# Patient Record
Sex: FEMALE | Race: WHITE | Employment: STUDENT | URBAN - METROPOLITAN AREA
[De-identification: names, ages, dates, MRNs, and addresses within clinical notes are randomized per-mention and may not be internally consistent; named-entity substitution may affect disease eponyms.]

---

## 2018-01-18 ENCOUNTER — OFFICE VISIT (OUTPATIENT)
Dept: ORTHOPEDIC SURGERY | Age: 23
End: 2018-01-18

## 2018-01-18 VITALS
RESPIRATION RATE: 16 BRPM | HEART RATE: 70 BPM | DIASTOLIC BLOOD PRESSURE: 71 MMHG | OXYGEN SATURATION: 100 % | SYSTOLIC BLOOD PRESSURE: 114 MMHG | BODY MASS INDEX: 27.49 KG/M2 | HEIGHT: 64 IN | WEIGHT: 161 LBS

## 2018-01-18 DIAGNOSIS — M54.6 TRIGGER POINT OF THORACIC REGION: ICD-10-CM

## 2018-01-18 DIAGNOSIS — G25.89 SCAPULAR DYSKINESIS: Primary | ICD-10-CM

## 2018-01-18 DIAGNOSIS — M99.08 RIB CAGE REGION SOMATIC DYSFUNCTION: ICD-10-CM

## 2018-01-18 DIAGNOSIS — M99.02 THORACIC REGION SOMATIC DYSFUNCTION: ICD-10-CM

## 2018-01-18 RX ORDER — PREDNISONE 10 MG/1
TABLET ORAL
Qty: 12 TAB | Refills: 0 | Status: SHIPPED | OUTPATIENT
Start: 2018-01-18 | End: 2018-02-08 | Stop reason: ALTCHOICE

## 2018-01-18 NOTE — PATIENT INSTRUCTIONS
Healthy Upper Back: Exercises  Your Care Instructions  Here are some examples of exercises for your upper back. Start each exercise slowly. Ease off the exercise if you start to have pain. Your doctor or physical therapist will tell you when you can start these exercises and which ones will work best for you. How to do the exercises  Lower neck and upper back stretch    1. Stretch your arms out in front of your body. Clasp one hand on top of your other hand. 2. Gently reach out so that you feel your shoulder blades stretching away from each other. 3. Gently bend your head forward. 4. Hold for 15 to 30 seconds. 5. Repeat 2 to 4 times. Midback stretch    If you have knee pain, do not do this exercise. 1. Kneel on the floor, and sit back on your ankles. 2. Lean forward, place your hands on the floor, and stretch your arms out in front of you. Rest your head between your arms. 3. Gently push your chest toward the floor, reaching as far in front of you as possible. 4. Hold for 15 to 30 seconds. 5. Repeat 2 to 4 times. Shoulder rolls    1. Sit comfortably with your feet shoulder-width apart. You can also do this exercise while standing. 2. Roll your shoulders up, then back, and then down in a smooth, circular motion. 3. Repeat 2 to 4 times. Wall push-up    1. Stand against a wall with your feet about 12 to 24 inches back from the wall. If you feel any pain when you do this exercise, stand closer to the wall. 2. Place your hands on the wall slightly wider apart than your shoulders, and lean forward. 3. Gently lean your body toward the wall. Then push back to your starting position. Keep the motion smooth and controlled. 4. Repeat 8 to 12 times. Resisted shoulder blade squeeze    For this exercise, you will need elastic exercise material, such as surgical tubing or Thera-Band. 1. Sit or stand, holding the band in both hands in front of you.  Keep your elbows close to your sides, bent at a 90-degree angle. Your palms should face up. 2. Squeeze your shoulder blades together, and move your arms to the outside, stretching the band. Be sure to keep your elbows at your sides while you do this. 3. Relax. 4. Repeat 8 to 12 times. Resisted rows    For this exercise, you will need elastic exercise material, such as surgical tubing or Thera-Band. 1. Put the band around a solid object, such as a bedpost, at about waist level. Hold one end of the band in each hand. 2. With your elbows at your sides and bent to 90 degrees, pull the band back to move your shoulder blades toward each other. Return to the starting position. 3. Repeat 8 to 12 times. Follow-up care is a key part of your treatment and safety. Be sure to make and go to all appointments, and call your doctor if you are having problems. It's also a good idea to know your test results and keep a list of the medicines you take. Where can you learn more? Go to http://kristina-mathieu.info/. Enter H654 in the search box to learn more about \"Healthy Upper Back: Exercises. \"  Current as of: March 21, 2017  Content Version: 11.4  © 6849-8247 Healthwise, Incorporated. Care instructions adapted under license by [employer] (which disclaims liability or warranty for this information). If you have questions about a medical condition or this instruction, always ask your healthcare professional. [place] [address] any warranty or liability for your use of this information.

## 2018-01-18 NOTE — MR AVS SNAPSHOT
[address] [place] [address], [address] [facility] [address] 
[phone] Patient: Marita Verde MRN: [id number] :1995 Visit Information Date & Time Provider Department Dept. Phone Encounter #  
 2018  9:00 AM Tony Wright, [address] [facility] [phone] [id number] Follow-up Instructions Return in about 3 weeks (around 2018) for ribs, trigger point. Upcoming Health Maintenance Date Due Hepatitis A Peds Age 1-18 (1 of 2 - Standard Series) 10/20/1998 DTaP/Tdap/Td series (1 - Tdap) 10/20/2004 HPV AGE 9Y-26Y (1 of 3 - Female 3 Dose Series) 10/20/2008 Influenza Age 5 to Adult 2017 Allergies as of 2018  Review Complete On: 2018 By: Tony Wright, DO No Known Allergies Current Immunizations  Never Reviewed No immunizations on file. Not reviewed this visit You Were Diagnosed With   
  
 Codes Comments Scapular dyskinesis    -  Primary ICD-10-CM: G25.89 ICD-9-CM: 781.3 Trigger point of thoracic region     ICD-10-CM: M54.6 ICD-9-CM: 724.5 Thoracic region somatic dysfunction     ICD-10-CM: M99.02 
ICD-9-CM: 739.2 Rib cage region somatic dysfunction     ICD-10-CM: M99.08 
ICD-9-CM: 739.8 Vitals BP Pulse Resp Height(growth percentile) Weight(growth percentile) LMP  
 114/71 (BP 1 Location: Left arm, BP Patient Position: Sitting) 70 16 5' 4\" (1.626 m) 161 lb (73 kg) 2018 (Exact Date) SpO2 BMI OB Status Smoking Status 100% 27.64 kg/m2 Having regular periods Never Smoker BMI and BSA Data Body Mass Index Body Surface Area  
 27.64 kg/m 2 1.82 m 2 Your Updated Medication List  
  
   
This list is accurate as of: 18  9:46 AM.  Always use your most recent med list.  
  
  
  
  
 predniSONE 10 mg tablet Commonly known as:  DELTASONE  
2 tabs daily for 4 days, then 1 tab daily until gone. Prescriptions Printed Refills  
 predniSONE (DELTASONE) 10 mg tablet 0 Si tabs daily for 4 days, then 1 tab daily until gone. Class: Print We Performed the Following INJECT TRIGGER POINTS, > 3 [id number] CPT(R)] [place] OSTEOPATHIC MANIP,1-2 BODY REGN [id number] CPT(R)] Follow-up Instructions Return in about 3 weeks (around 2018) for ribs, trigger point. Patient Instructions Healthy Upper Back: Exercises Your Care Instructions Here are some examples of exercises for your upper back. Start each exercise slowly. Ease off the exercise if you start to have pain. Your doctor or physical therapist will tell you when you can start these exercises and which ones will work best for you. How to do the exercises Lower neck and upper back stretch 1. Stretch your arms out in front of your body. Clasp one hand on top of your other hand. 2. Gently reach out so that you feel your shoulder blades stretching away from each other. 3. Gently bend your head forward. 4. Hold for 15 to 30 seconds. 5. Repeat 2 to 4 times. Midback stretch If you have knee pain, do not do this exercise. 1. Kneel on the floor, and sit back on your ankles. 2. Lean forward, place your hands on the floor, and stretch your arms out in front of you. Rest your head between your arms. 3. Gently push your chest toward the floor, reaching as far in front of you as possible. 4. Hold for 15 to 30 seconds. 5. Repeat 2 to 4 times. Shoulder rolls 1. Sit comfortably with your feet shoulder-width apart. You can also do this exercise while standing. 2. Roll your shoulders up, then back, and then down in a smooth, circular motion. 3. Repeat 2 to 4 times. Wall push-up 1. Stand against a wall with your feet about 12 to 24 inches back from the wall. If you feel any pain when you do this exercise, stand closer to the wall.  
2. Place your hands on the wall slightly wider apart than your shoulders, and lean forward. 3. Gently lean your body toward the wall. Then push back to your starting position. Keep the motion smooth and controlled. 4. Repeat 8 to 12 times. Resisted shoulder blade squeeze For this exercise, you will need elastic exercise material, such as surgical tubing or Thera-Band. 1. Sit or stand, holding the band in both hands in front of you. Keep your elbows close to your sides, bent at a 90-degree angle. Your palms should face up. 2. Squeeze your shoulder blades together, and move your arms to the outside, stretching the band. Be sure to keep your elbows at your sides while you do this. 3. Relax. 4. Repeat 8 to 12 times. Resisted rows For this exercise, you will need elastic exercise material, such as surgical tubing or Thera-Band. 1. Put the band around a solid object, such as a bedpost, at about waist level. Hold one end of the band in each hand. 2. With your elbows at your sides and bent to 90 degrees, pull the band back to move your shoulder blades toward each other. Return to the starting position. 3. Repeat 8 to 12 times. Follow-up care is a key part of your treatment and safety. Be sure to make and go to all appointments, and call your doctor if you are having problems. It's also a good idea to know your test results and keep a list of the medicines you take. Where can you learn more? Go to http://kristina-mathieu.info/. Enter J729 in the search box to learn more about \"Healthy Upper Back: Exercises. \" Current as of: March 21, 2017 Content Version: 11.4 © 7568-3614 Healthwise, Incorporated. Care instructions adapted under license by [employer] (which disclaims liability or warranty for this information). If you have questions about a medical condition or this instruction, always ask your healthcare professional. [place] [address] any warranty or liability for your use of this information. Introducing [facility]! [facility] introduces [employer] patient portal. Now you can access parts of your medical record, email your doctor's office, and request medication refills online. 1. In your internet browser, go to [email or web address] 2. Click on the First Time User? Click Here link in the Sign In box. You will see the New Member Sign Up page. 3. Enter your [employer] Access Code exactly as it appears below. You will not need to use this code after youve completed the sign-up process. If you do not sign up before the expiration date, you must request a new code. · [employer] Access Code: [email or web address] Expires: 4/18/2018  9:41 AM 
 
4. Enter the last four digits of your Social Security Number (xxxx) and Date of Birth (mm/dd/yyyy) as indicated and click Submit. You will be taken to the next sign-up page. 5. Create a [employer] ID. This will be your [employer] login ID and cannot be changed, so think of one that is secure and easy to remember. 6. Create a [employer] password. You can change your password at any time. 7. Enter your Password Reset Question and Answer. This can be used at a later time if you forget your password. 8. Enter your e-mail address. You will receive e-mail notification when new information is available in [address] 9. Click Sign Up. You can now view and download portions of your medical record. 10. Click the Download Summary menu link to download a portable copy of your medical information. If you have questions, please visit the Frequently Asked Questions section of the [employer] website. Remember, [employer] is NOT to be used for urgent needs. For medical emergencies, dial 911. Now available from your iPhone and Android! Please provide this summary of care documentation to your next provider. If you have any questions after today's visit, please call [phone].

## 2018-01-18 NOTE — PROGRESS NOTES
HISTORY OF PRESENT ILLNESS    Melanie Diamond is a [address] [facility] [employer] year old female comes in today as new patient for: rib pain left    Patients symptoms have been present for 2 months. Pain level 4/10 left ribs, It has worsened with swimming. Patient has tried:  Ice and stim from ATC at [employer]. It is described as pain in lateral ribs w/o known cause. Social History     Social History    Marital status: SINGLE     Spouse name: N/A    Number of children: N/A    Years of education: N/A     Social History Main Topics    Smoking status: Never Smoker    Smokeless tobacco: Never Used    Alcohol use No    Drug use: No    Sexual activity: Not Asked     Other Topics Concern    None     Social History Narrative    None       Past Medical History:   Diagnosis Date    Arthritis      Family History   Problem Relation Age of Onset    No Known Problems Father          ROS:  No numb, tingle, swell, bruise. All other systems reviewed and negative aside from that written in the HPI. Objective:  Visit Vitals    /71 (BP 1 Location: Left arm, BP Patient Position: Sitting)    Pulse 70    Resp 16    Ht 5' 4\" (1.626 m)    Wt 161 lb (73 kg)    LMP 01/17/2018 (Exact Date)    SpO2 100%    BMI 27.64 kg/m2     GEN:  Appears stated age in NAD. HEAD:  Normocephalic, Atraumatic. NEURO:  Sensation intact light touch upper and lower extremities. Biceps & Triceps reflexes +2/4 bilaterally. Patellar and Achilles +2/4  M/S:  Examined seating and supine. Spurling negative bilateral .  Cervical rotation Normal bilateral  TTA at T2, 4, 5, 7 on left, worse with flexion. Rib(s) 5, 7, 8, 9 TTP on left. Strength +5/5 Bilateral upper and lower extremities. Trigge rpoints trap left at T 5, 6, 7. EXT  no clubbing/cyanosis. no edema. SKIN: Warm & dry w/o rash. HEENT: Conjunctiva/lids WNL. External canals/nares WNL. Tongue midline. PERRL, EOMI. Hearing intact. NECK: Trachea midline. Supple, Full ROM. No thyromegaly.   CARDIAC: No edema.  LUNGS: Normal effort. ABD: Soft, no masses. No HSM. PSYCH: A+O x3. Appropriate judgment and insight. Assessment/Plan:     ICD-10-CM ICD-9-CM    1. Scapular dyskinesis G25.89 781.3 predniSONE (DELTASONE) 10 mg tablet   2. Trigger point of thoracic region M54.6 724.5 INJECT TRIGGER POINTS, > 3      predniSONE (DELTASONE) 10 mg tablet   3. Thoracic region somatic dysfunction M99.02 739.2 LA OSTEOPATHIC MANIP,1-2 BODY REGN   4. Rib cage region somatic dysfunction M99.08 739.8 LA OSTEOPATHIC MANIP,1-2 BODY REGN       Patient verbalizes understanding of evaluation and plan. Injected trigger points and manipulated Tspine and ribs w/ benefit. Will work on HEP/stretch upper back and take this weekend off from swimming at [employer] (letter provided) and if improving gradual return to pool in next week or so and RTC 2-3 weeks.

## 2018-01-18 NOTE — LETTER
NOTIFICATION RETURN TO WORK / SCHOOL 
 
1/18/2018 9:44 AM 
 
Ms. Sherin Anderson [facility] [address] To Whom It May Concern: 
 
Sherin Anderson is currently under the care of [address] [place] [address]. Just do rehab and ice/stim/stretch until Monday and then if symptoms improved may slowly transition to pool. Will plan follow-up in 2-3 weeks. If there are questions or concerns please have the patient contact our office.  
 
 
 
Sincerely, 
 
 
Kamala Pascual, DO

## 2018-01-18 NOTE — PROCEDURES
PROCEDURE NOTE:  Time out:932am  * Patient was identified by name and date of birth   * Agreement on procedure being performed was verified  * Risks and Benefits explained to the patient  * Procedure site verified and marked as necessary  * Patient was positioned for comfort  * Consent was signed and verified. Risks/benefits including but not limited to bleeding, infection, and scarring discussed and Pt wishes to proceed with procedure. A trigger point injection was performed at the site of maximal tenderness (left trap T5, 6, 7 using 1 cc 1% lidocaine each. This was well tolerated, and sterile bandage applied. Pain is now rated 0-1/10. Discussed S/Sx infection and advised to contact me if any issues.

## 2018-01-22 ENCOUNTER — TELEPHONE (OUTPATIENT)
Dept: ORTHOPEDIC SURGERY | Age: 23
End: 2018-01-22

## 2018-01-22 DIAGNOSIS — G25.89 SCAPULAR DYSKINESIS: Primary | ICD-10-CM

## 2018-01-22 RX ORDER — MELOXICAM 15 MG/1
TABLET ORAL
Qty: 90 TAB | Refills: 0 | Status: SHIPPED | OUTPATIENT
Start: 2018-01-22

## 2018-01-22 NOTE — TELEPHONE ENCOUNTER
Called to discuss issues with ribs not much benefit so will shut her down from swimming and start PT and RTC 1 month.

## 2018-01-22 NOTE — TELEPHONE ENCOUNTER
PATIENT CALLED AND SAID SHE NEEDED TO SPEAK WITH DR. Jean Hanks ABOUT THE PAIN IN HER RIBS. PATIENT SAID THAT THE CORTISONE INJECTION ON 1/18/18 DID NOT WORK. PATIENT SAID HER  WOULD LIKE TO KNOW IF SHE CAN BE ON PAIN MEDICATION. [facility] [place] [address] [phone]. PATIENT [phone].

## 2018-02-01 ENCOUNTER — HOSPITAL ENCOUNTER (OUTPATIENT)
Dept: PHYSICAL THERAPY | Age: 23
Discharge: HOME | End: 2018-02-01
Payer: COMMERCIAL

## 2018-02-01 PROCEDURE — 97110 THERAPEUTIC EXERCISES: CPT

## 2018-02-01 PROCEDURE — 97140 MANUAL THERAPY 1/> REGIONS: CPT

## 2018-02-01 PROCEDURE — 97161 PT EVAL LOW COMPLEX 20 MIN: CPT

## 2018-02-01 PROCEDURE — 97035 APP MDLTY 1+ULTRASOUND EA 15: CPT

## 2018-02-01 NOTE — PROGRESS NOTES
[address] [facility] [address] [place], [place]  Phone: [phone] Fax: [phone] / STATEMENT OF MEDICAL NECESSITY FOR PHYSICAL THERAPY SERVICES  Patient Name: Caryle Katz : 1995   Medical   Diagnosis: Rib pain on left side [R07.81] Treatment Diagnosis: L costochondritis and scapular dyskinesia   Onset Date: 2-3 mo ago      Referral Source: Gen Mari DO Start of Care [facility] [place]): 2018   Prior Hospitalization: See medical history Provider #: [id number]   Prior Level of Function: Functional I    Comorbidities: None noted    Medications: Verified on Patient Summary List   The Plan of Care and following information is based on the information from the initial evaluation.   ========================================================================  Assessment / key information:  Pt is a 23y.o. year old female who presents with co L sided rib pain (approx 10-11) starting 2-3 mo ago insidious onset with progressive worsening. 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 Trigger point injection was unsuccessful in pain relief. Patient has attempted TEN and Ice with ATC at [employer] with min improvement. ,Meloxicam,  stretching and massage has helped some in recent weeks. Patient is currently still swimming to train for championship and feels the pain limits her from training. Patient is R hand dominant. Current deficits include: increased pain to 10/10 at worst, TTP with taut bands of L periscapulars and L costochondral junction, pain with rib PA mobs, postural strength: MT 4+/5, LT 3/5, forward shoulder posture and sap winging B. Functional deficits include: laying on L side: intermittent sleep interruption, anything elevating HR/ increased breathing frequency, limited chest expansion, swimming- on scholarship, wt training. Home exercise program initiated on initial evaluation to address these deficits.  Pt would benefit from PT to address these deficits for increased functional mobility and QOL.  ========================================================================  Eval Complexity: History: LOW Complexity : Zero comorbidities / personal factors that will impact the outcome / POCExam:HIGH Complexity : 4+ Standardized tests and measures addressing body structure, function, activity limitation and / or participation in recreation  Presentation: MEDIUM Complexity : Evolving with changing characteristics  Clinical Decision Making:MEDIUM Complexity : FOTO score of 26-74Overall Complexity:LOW   Problem List: pain affecting function, decrease strength, impaired gait/ balance, decrease ADL/ functional abilitiies, decrease activity tolerance and decrease flexibility/ joint mobility   Treatment Plan may include any combination of the following: Therapeutic exercise, Therapeutic activities, Neuromuscular re-education, Physical agent/modality, Gait/balance training, Manual therapy, Aquatic therapy, Patient education, Self Care training, Functional mobility training and Home safety training  Patient / Family readiness to learn indicated by: asking questions, trying to perform skills and interest  Persons(s) to be included in education: patient (P)  Barriers to Learning/Limitations: None  Measures taken:    Patient Goal (s): \"to make the pain go away \"   Patient self reported health status: good  Rehabilitation Potential: good   Short Term Goals: To be accomplished in  1  weeks:  1. Pt will be independent and compliant with HEP   Long Term Goals: To be accomplished in  8-12  treatments:  1. Patient will increase FOTO score to 67/100 for indications of increased functional mobility. 2.  Patient will demo LT strength 4/5 for improve scapular strength with breast stroke   3. Patient will demo 5/5 MT strength for improved postural stability with sitting in class   4.  Patient will report 50% improvement in sx for progression of [employer] quality for school scholarship  Frequency / Duration:   Patient to be seen  2-3  times per week for 8-12  treatments:  Patient / Caregiver education and instruction: self care, activity modification and exercises  G-Codes (GP): MESERET  Therapist Signature: Wally Freedman PT Date: 4/5/4430   Certification Period: NA Time: 231pm    ========================================================================  I certify that the above Physical Therapy Services are being furnished while the patient is under my care. I agree with the treatment plan and certify that this therapy is necessary. Physician Signature:        Date:       Time:   Please sign and return to [facility] or you may fax the signed copy to [phone]. Thank you.

## 2018-02-01 NOTE — PROGRESS NOTES
PT LUMBAR EVAL AND TREATMENT     Patient Name: Dez Noble  Date:2018  : 1995  [x]  Patient  Verified  Payor: BLUE CROSS / Plan: [facility] PPO / Product Type: PPO /    In time:100  Out time:157  Total Treatment Time (min): 62  Visit #: 1 of     Treatment Area: Rib pain on left side [R07.81]    SUBJECTIVE  Pain Level (0-10 scale): (C): 3-4  (B): 0  (W):  10  Any medication changes, allergies to medications, diagnosis change, or new procedure performed: see summary sheet for update  Subjective functional status/changes  CHIEF COMPLAINT: Patient reports with co L sided rib pain starting 2-3 mo ago insidious onset with progressive worsening. 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 Trigger point injection was unsuccessful in pain relief. Patient has attempted TEN and Ice with ATC at [employer] with min improvement. ,Meloxicam,  stretching and massage has helped some in recent weeks. Patient is currently still swimming to train for championship and feels the pain limits her from training. Patient is R hand dominant.      Functional Status  Present functional limitations: laying on L side: intermittent sleep interruption, anything elevating HR/ increased breathing frequency, limited chest expansion, swimming- on scholarship, wt training    Mechanism of injury:  Symptoms:  Aggravated by: see functional limitations   Eased by: as above    Past History/Treatments:  Previous PT for shoulder pain   Diagnostic Tests: None     OBJECTIVE  Posture:  Scap winging B - lateral scpa positioning     Gait:  WNL     Active Movements:Shoulder AROM WNL - no pain     Palpation L periscapular,  L costochondral tenderness     Strength  Shoulder grossly 5/5   Scap: R 5/5 MT and LT    L MT 4+/5, LT 3/5    Other tests/comments:      Patient Education/ Therapeutic Exercise : [x] Discussed POT including PT expectation, established HEP with pictures and instruction, per FS instruction (minutes) : 9    Manual DTM and MFR to L medial periscapular's, ant to post rib mobs in supine  min: 12    Modality (rationale): decrease pain and promote tissues elasticity   [x]  US - cont 1.5 w/c,2 1 mhz 8 min     Pain Level (0-10 scale) post treatment: 4    ASSESSMENT  [x]  See Plan of Care    PLAN  [x]  Upgrade activities as tolerated      [x] Other:_  POC 2-3 x 8-12  Erin Friend, PT 2/1/2018      Justification for Eval Code Complexity:  Patient History : none noted   Examination see exam   Clinical Presentation: evolving sec to unknown etiology   Clinical Decision Making : FOTO : 52 /100

## 2018-02-06 ENCOUNTER — HOSPITAL ENCOUNTER (OUTPATIENT)
Dept: PHYSICAL THERAPY | Age: 23
Discharge: HOME | End: 2018-02-06
Payer: COMMERCIAL

## 2018-02-06 PROCEDURE — 97140 MANUAL THERAPY 1/> REGIONS: CPT | Performed by: PHYSICAL THERAPIST

## 2018-02-06 PROCEDURE — 97110 THERAPEUTIC EXERCISES: CPT | Performed by: PHYSICAL THERAPIST

## 2018-02-06 NOTE — PROGRESS NOTES
PT DAILY TREATMENT NOTE     Patient Name: Margarita Bailon  Date:2018  : 1995  [x]  Patient  Verified  Payor: BLUE CROSS / Plan: [facility] PPO / Product Type: PPO /    In time:10:56  Out time:1148  Total Treatment Time (min): 52  Total Timed Codes (min): 47  1:1 Treatment Time (min):    Visit #: 2 of     Treatment Area: Rib pain on left side [R07.81]    SUBJECTIVE  Pain Level (0-10 scale): 5  Any medication changes, allergies to medications, adverse drug reactions, diagnosis change, or new procedure performed?: [x] No    [] Yes (see summary sheet for update)  Subjective functional status/changes:   [] No changes reported  I am doing my HEP, but it just hurts all the time. I feel it in the front unless someone is touching my back, then I feel it there. ( in my back)\"    OBJECTIVE  Modality rationale: decrease inflammation, decrease pain and increase tissue extensibility to improve the patients ability to perform functional mobility and improve activity  endurance     Min Type Additional Details    [] Estim: []Att   []Unatt        []TENS instruct                  []IFC  []Premod   []NMES                     []Other:  []w/US   []w/ice   []w/heat  Position:  Location:    []  Traction: [] Cervical       []Lumbar                       [] Prone          []Supine                       []Intermittent   []Continuous Lbs:  [] before manual  [] after manual    []  Ultrasound: []Continuous   [] Pulsed                           []1MHz   []3MHz Location:  W/cm2:    []  Iontophoresis with dexamethasone         Location: [] Take home patch   [] In clinic   PD []  Ice     []  heat  []  Ice massage Position:  Location:    []  Vasopneumatic Device Pressure:       [] lo [] med [] hi   Temperature: [] lo [] med [] hi   [] Skin assessment post-treatment:  []intact []redness- no adverse reaction       []redness  adverse reaction:       42 min Therapeutic Exercise:  [] See flow sheet :   Rationale: increase ROM, increase strength and improve coordination to 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    10 min Manual Therapy:  T/s PA moves, Rib mobes, TrP r to Rhomboids on L and ps mm. Rationale: decrease pain, increase ROM, increase tissue extensibility, decrease trigger points and increase postural awareness to return to PLOF and swimming painfree          x min Patient Education: [] Review HEP    [x] Progressed/Changed HEP based on:   [] positioning   [] body mechanics   [] transfers   [] heat/ice application        Other Objective/Functional Measures: Initiated POC with TC needed for TB activities to use LT over UT substit. , gave wall V's for HEP to facilitate LT , push up +     Pain Level (0-10 scale) post treatment: 6 sore    ASSESSMENT/Changes in Function: Patient did will with initial exercises with TC needed for LT activation. Presents with fwd shoulders and winging, progress to SA exercises and progressive LT strengthening. Patient will continue to benefit from skilled PT services to modify and progress therapeutic interventions, address functional mobility deficits, address ROM deficits, address strength deficits, analyze and address soft tissue restrictions, analyze and cue movement patterns, analyze and modify body mechanics/ergonomics and assess and modify postural abnormalities to attain remaining goals. []  See Plan of Care  []  See progress note/recertification  []  See Discharge Summary         Progress towards goals / Updated goals: · Short Term Goals: To be accomplished in  1  weeks:  1. Pt will be independent and compliant with HEP Progressing 2-6-18  · Long Term Goals: To be accomplished in  8-12  treatments:  1. Patient will increase FOTO score to 67/100 for indications of increased functional mobility. 2.  Patient will demo LT strength 4/5 for improve scapular strength with breast stroke   3.  Patient will demo 5/5 MT strength for improved postural stability with sitting in class   4.  Patient will report 50% improvement in sx for progression of swimming quality for school scholarship    PLAN  []  Upgrade activities as tolerated     [x]  Continue plan of care  []  Update interventions per flow sheet       []  Discharge due to:_  []  Other:_      Elis Lopez, PT 2/6/2018  10:01 AM

## 2018-02-08 ENCOUNTER — HOSPITAL ENCOUNTER (OUTPATIENT)
Dept: PHYSICAL THERAPY | Age: 23
Discharge: HOME | End: 2018-02-08
Payer: COMMERCIAL

## 2018-02-08 ENCOUNTER — OFFICE VISIT (OUTPATIENT)
Dept: ORTHOPEDIC SURGERY | Age: 23
End: 2018-02-08

## 2018-02-08 VITALS
HEIGHT: 64 IN | BODY MASS INDEX: 27.66 KG/M2 | RESPIRATION RATE: 15 BRPM | HEART RATE: 79 BPM | WEIGHT: 162 LBS | TEMPERATURE: 96.6 F | DIASTOLIC BLOOD PRESSURE: 53 MMHG | SYSTOLIC BLOOD PRESSURE: 117 MMHG

## 2018-02-08 DIAGNOSIS — G25.89 SCAPULAR DYSKINESIS: Primary | ICD-10-CM

## 2018-02-08 PROCEDURE — 97110 THERAPEUTIC EXERCISES: CPT

## 2018-02-08 PROCEDURE — 97140 MANUAL THERAPY 1/> REGIONS: CPT

## 2018-02-08 RX ORDER — IBUPROFEN 200 MG
200 CAPSULE ORAL AS NEEDED
COMMUNITY

## 2018-02-08 NOTE — MR AVS SNAPSHOT
[address] [place] [address] [address], [address] [facility] [phone] 
[phone] Patient: Malaika Weldon MRN: [id number] :1995 Visit Information Date & Time Provider Department Dept. Phone Encounter #  
 2018  9:00 AM Paul Lao, [address] [facility] [phone] [id number] Upcoming Health Maintenance Date Due Hepatitis A Peds Age 1-18 (1 of 2 - Standard Series) 10/20/1998 DTaP/Tdap/Td series (1 - Tdap) 10/20/2004 HPV AGE 9Y-26Y (1 of 3 - Female 3 Dose Series) 10/20/2008 Influenza Age 5 to Adult 2017 Allergies as of 2018  Review Complete On: 2018 By: Paul Lao DO No Known Allergies Current Immunizations  Never Reviewed No immunizations on file. Not reviewed this visit You Were Diagnosed With   
  
 Codes Comments Scapular dyskinesis    -  Primary ICD-10-CM: G25.89 ICD-9-CM: 226. 3 Vitals BP Pulse Temp Resp Height(growth percentile) Weight(growth percentile) 117/53 79 96.6 °F (35.9 °C) 15 5' 4\" (1.626 m) 162 lb (73.5 kg) LMP BMI OB Status Smoking Status 2018 (Exact Date) 27.81 kg/m2 Having regular periods Never Smoker Vitals History BMI and BSA Data Body Mass Index Body Surface Area  
 27.81 kg/m 2 1.82 m 2 Preferred Pharmacy Pharmacy Name Phone [facility] [address] [phone] Your Updated Medication List  
  
   
This list is accurate as of: 18  9:17 AM.  Always use your most recent med list.  
  
  
  
  
 ibuprofen 200 mg Cap Take 200 mg by mouth as needed. meloxicam 15 mg tablet Commonly known as:  MOBIC Take 1 tab daily as needed pain with food.   
  
  
  
  
To-Do List   
 2018 10:30 AM  
  Appointment with Tamiko Gomes PTA at [facility] [address] ([phone])  
  
 2018 11:30 AM  
 Appointment with [address] at [facility] ([phone])  
  
 02/15/2018 10:30 AM  
  Appointment with Cristy Rachel PTA at [facility] ([phone])  
  
 02/27/2018 9:30 AM  
  Appointment with Cristy Rachel PTA at [facility] ([phone])  
  
 03/01/2018 10:00 AM  
  Appointment with Cristy Rachel PTA at [facility] ([phone]) Introducing [facility] SERVICES! [employer] introduces [employer] patient portal. Now you can access parts of your medical record, email your doctor's office, and request medication refills online. 1. In your internet browser, go to [email or web address] 2. Click on the First Time User? Click Here link in the Sign In box. You will see the New Member Sign Up page. 3. Enter your [employer] Access Code exactly as it appears below. You will not need to use this code after youve completed the sign-up process. If you do not sign up before the expiration date, you must request a new code. · [employer] Access Code: [email or web address] Expires: 4/18/2018  9:41 AM 
 
4. Enter the last four digits of your Social Security Number (xxxx) and Date of Birth (mm/dd/yyyy) as indicated and click Submit. You will be taken to the next sign-up page. 5. Create a [email or web address] ID. This will be your [employer] login ID and cannot be changed, so think of one that is secure and easy to remember. 6. Create a [email or web address] password. You can change your password at any time. 7. Enter your Password Reset Question and Answer. This can be used at a later time if you forget your password. 8. Enter your e-mail address. You will receive e-mail notification when new information is available in [address] 9. Click Sign Up. You can now view and download portions of your medical record. 10. Click the Download Summary menu link to download a portable copy of your medical information.  
 
If you have questions, please visit the Frequently Asked Questions section of the [employer]. Remember, [employer] is NOT to be used for urgent needs. For medical emergencies, dial 911. Now available from your iPhone and Android! Please provide this summary of care documentation to your next provider. Your primary care clinician is listed as NONE. If you have any questions after today's visit, please call [phone].

## 2018-02-08 NOTE — PROGRESS NOTES
PT DAILY TREATMENT NOTE     Patient Name: Kt Aquino  Date:2018  : 1995  [x]  Patient  Verified  Payor: BLUE CROSS / Plan: [facility] PPO / Product Type: PPO /    In time: 10:18 am          Out time: 11:20 am  Total Treatment Time (min): 62  Visit #: 3 of     Treatment Area: Rib pain on left side [R07.81]    SUBJECTIVE  Pain Level (0-10 scale): 6-8  Any medication changes, allergies to medications, adverse drug reactions, diagnosis change, or new procedure performed?: [x] No    [] Yes (see summary sheet for update)  Subjective functional status/changes:   [] No changes reported  \"It hurts to breathe deeply. \"    OBJECTIVE  Modality rationale: decrease inflammation, decrease pain and increase tissue extensibility to improve the patients ability to perform functional mobility and improve activity  endurance     Min Type Additional Details    [] Estim: []Att   []Unatt        []TENS instruct                  []IFC  []Premod   []NMES                     []Other:  []w/US   []w/ice   []w/heat  Position:  Location:    []  Traction: [] Cervical       []Lumbar                       [] Prone          []Supine                       []Intermittent   []Continuous Lbs:  [] before manual  [] after manual    []  Ultrasound: []Continuous   [] Pulsed                           []1MHz   []3MHz Location:  W/cm2:    []  Iontophoresis with dexamethasone         Location: [] Take home patch   [] In clinic   10 []  Ice     [x]  heat  []  Ice massage Position: supine with LEs elevated on wedge  Location: (L) chest/rib    []  Vasopneumatic Device Pressure:       [] lo [] med [] hi   Temperature: [] lo [] med [] hi   [x] Skin assessment post-treatment:  [x]intact []redness- no adverse reaction       []redness  adverse reaction:       37 min Therapeutic Exercise:  [x] See flow sheet: initiated therex per IE   Rationale: increase ROM, increase strength and improve coordination to 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    15 min Manual Therapy:  STM to (R) rhomboids, MWM to ribs T8-10 in supine   Rationale: decrease pain, increase ROM, increase tissue extensibility, decrease trigger points and increase postural awareness to return to PLOF and swimming painfree          X min Patient Education: [x] Review HEP     Other Objective/Functional Measures:    Cavitation noted with MWM in T10    Pain Level (0-10 scale) post treatment: 6 sore    ASSESSMENT/Changes in Function:   Good tolerance to treatment. Mild cavitation noted with rib mobs. Patient has been cont'ing with swimming and notes post-activity aggravation. Patient will continue to benefit from skilled PT services to modify and progress therapeutic interventions, address functional mobility deficits, address ROM deficits, address strength deficits, analyze and address soft tissue restrictions, analyze and cue movement patterns, analyze and modify body mechanics/ergonomics and assess and modify postural abnormalities to attain remaining goals. [x]  See Plan of Care  []  See progress note/recertification  []  See Discharge Summary         Progress towards goals / Updated goals:  Short Term Goals: To be accomplished in  1  weeks:  1. Pt will be independent and compliant with HEP Progressing 2-6-18  Long Term Goals: To be accomplished in  8-12  treatments:  1. Patient will increase FOTO score to 67/100 for indications of increased functional mobility. 2.  Patient will demo LT strength 4/5 for improve scapular strength with breast stroke   3. Patient will demo 5/5 MT strength for improved postural stability with sitting in class   4.   Patient will report 50% improvement in sx for progression of swimming quality for school scholarship    PLAN  []  Upgrade activities as tolerated     [x]  Continue plan of care  []  Update interventions per flow sheet       []  Discharge due to:_  []  Other:_      Ariadna Carmichael PTA 2/8/2018

## 2018-02-08 NOTE — PROGRESS NOTES
HISTORY OF PRESENT ILLNESS    Mily Beverly is a 23y.o. year old female comes in today to be evaluated and treated for: ribs, scapular dyskinesis    Had trigger points last appt/ w/ small benefit and tried to swim at [employer] w/ warm ups 20 minutes and pain immediately. Has been eval and Dx significant scapular dyskinesis and one appt for therapy thus far. Pain 8/10. Has used mobic w/ benefit. Social History     Social History    Marital status: SINGLE     Spouse name: N/A    Number of children: N/A    Years of education: N/A     Social History Main Topics    Smoking status: Never Smoker    Smokeless tobacco: Never Used    Alcohol use No    Drug use: No    Sexual activity: Not Asked     Other Topics Concern    None     Social History Narrative     Current Outpatient Prescriptions   Medication Sig Dispense Refill    ibuprofen 200 mg cap Take 200 mg by mouth as needed.  meloxicam (MOBIC) 15 mg tablet Take 1 tab daily as needed pain with food. 80 Tab 0     Past Medical History:   Diagnosis Date    Arthritis      Family History   Problem Relation Age of Onset    No Known Problems Father          ROS:  No numb, tingle, swell, bruise    Objective:  Visit Vitals    /53    Pulse 79    Temp 96.6 °F (35.9 °C)    Resp 15    Ht 5' 4\" (1.626 m)    Wt 162 lb (73.5 kg)    LMP 01/17/2018 (Exact Date)    BMI 27.81 kg/m2     GEN:  Appears stated age in NAD. HEAD:  Normocephalic, Atraumatic. NEURO:  Sensation intact light touch upper and lower extremities. Biceps & Triceps reflexes +2/4 bilaterally. Patellar and Achilles +2/4  M/S:  Examined seating and supine. Spurling negative bilateral .  Cervical rotation Decreased right TTA at T2, 4, 5, 7 on right, worse with flexion. C3 left worse flexion. Rib(s) 2, 4, 5 TTP on right. Strength +5/5 Bilateral upper and lower extremities. EXT  no clubbing/cyanosis. no edema. SKIN: Warm & dry w/o rash. Assessment/Plan:   Encounter Diagnosis   Name Primary?  Scapular dyskinesis Yes       Patient verbalizes understanding of evaluation and plan. Time with Pt 27 minutes, >50% of which was counseling pt regarding Dx and Tx options need fro PT/HEP and coordination of care.

## 2018-02-08 NOTE — LETTER
NOTIFICATION RETURN TO WORK / SCHOOL 
 
2/8/2018 9:18 AM 
 
Ms. Tosha Steiner [facility] [phone] To Whom It May Concern: 
 
Tosha Steiner is currently under the care of [address] [place] [address] until cleared by myself of physical therapist.  Likely March 2018. If there are questions or concerns please have the patient contact our office.  
 
 
 
Sincerely, 
 
 
Arcelia Sosa, DO

## 2018-02-13 ENCOUNTER — HOSPITAL ENCOUNTER (OUTPATIENT)
Dept: PHYSICAL THERAPY | Age: 23
Discharge: HOME | End: 2018-02-13
Payer: COMMERCIAL

## 2018-02-13 PROCEDURE — 97110 THERAPEUTIC EXERCISES: CPT | Performed by: PHYSICAL THERAPIST

## 2018-02-13 PROCEDURE — 97140 MANUAL THERAPY 1/> REGIONS: CPT | Performed by: PHYSICAL THERAPIST

## 2018-02-13 NOTE — PROGRESS NOTES
PT DAILY TREATMENT NOTE     Patient Name: Hoang Adams  Date:2018  : 1995  [x]  Patient  Verified  Payor: BLUE CROSS / Plan: [facility] PPO / Product Type: PPO /    In time: 1132 am          Out time: 1229 am  Total Treatment Time (min): 62  Visit #: 4 of     Treatment Area: Rib pain on left side [R07.81]    SUBJECTIVE  Pain Level (0-10 scale): 4-5  Any medication changes, allergies to medications, adverse drug reactions, diagnosis change, or new procedure performed?: [x] No    [] Yes (see summary sheet for update)  Subjective functional status/changes:   [] No changes reported  \"I still feels about the same, no change. \"    OBJECTIVE  Modality rationale: decrease inflammation, decrease pain and increase tissue extensibility to improve the patients ability to perform functional mobility and improve activity  endurance     Min Type Additional Details    [] Estim: []Att   []Unatt        []TENS instruct                  []IFC  []Premod   []NMES                     []Other:  []w/US   []w/ice   []w/heat  Position:  Location:    []  Traction: [] Cervical       []Lumbar                       [] Prone          []Supine                       []Intermittent   []Continuous Lbs:  [] before manual  [] after manual    []  Ultrasound: []Continuous   [] Pulsed                           []1MHz   []3MHz Location:  W/cm2:    []  Iontophoresis with dexamethasone         Location: [] Take home patch   [] In clinic   PD []  Ice     [x]  heat  []  Ice massage Position: supine with LEs elevated on wedge  Location: (L) chest/rib    []  Vasopneumatic Device Pressure:       [] lo [] med [] hi   Temperature: [] lo [] med [] hi   [x] Skin assessment post-treatment:  [x]intact []redness- no adverse reaction       []redness  adverse reaction:       42 min Therapeutic Exercise:  [x] See flow sheet:    Rationale: increase ROM, increase strength and improve coordination to improve the patients ability to swim for [employer] without pain    15 min Manual Therapy:  STM to (R) rhomboids, MWM to ribs T8-10 in supine   Rationale: decrease pain, increase ROM, increase tissue extensibility, decrease trigger points and increase postural awareness to return to PLOF and swimming painfree          X min Patient Education: [x] Review HEP     Other Objective/Functional Measures:       Noted Rib elevation on R in prone, Rib flare elevated on L in Supine, patient has not had x-rays , no rib hump noted in standing flexion. Reviewed HEP for form, added prone T with 2# weight, and SA punches at [employer] with good tolerance     Pain Level (0-10 scale) post treatment: 4 sore    ASSESSMENT/Changes in Function:   Minimal changes in pain levels long term. Patient continues with significant TTP on L lower intercostals anterior/lateral. Mild reduction in pain following MFR to area with lower trunk rotation. Patient will continue to benefit from skilled PT services to modify and progress therapeutic interventions, address functional mobility deficits, address ROM deficits, address strength deficits, analyze and address soft tissue restrictions, analyze and cue movement patterns, analyze and modify body mechanics/ergonomics and assess and modify postural abnormalities to attain remaining goals. [x]  See Plan of Care  []  See progress note/recertification  []  See Discharge Summary         Progress towards goals / Updated goals:  Short Term Goals: To be accomplished in  1  weeks:  1. Pt will be independent and compliant with HEP Progressing 2-6-18  Long Term Goals: To be accomplished in  8-12  treatments:  1. Patient will increase FOTO score to 67/100 for indications of increased functional mobility. 2.  Patient will demo LT strength 4/5 for improve scapular strength with breast stroke   3. Patient will demo 5/5 MT strength for improved postural stability with sitting in class   4.   Patient will report 50% improvement in sx for progression of swimming quality for school scholarship    PLAN  []  Upgrade activities as tolerated     [x]  Continue plan of care  []  Update interventions per flow sheet       []  Discharge due to:_  []  Other:_      Alexandro Beach, PT 2/13/2018

## 2018-02-15 ENCOUNTER — HOSPITAL ENCOUNTER (OUTPATIENT)
Dept: PHYSICAL THERAPY | Age: 23
Discharge: HOME | End: 2018-02-15
Payer: COMMERCIAL

## 2018-02-15 PROCEDURE — 97110 THERAPEUTIC EXERCISES: CPT

## 2018-02-15 PROCEDURE — 97035 APP MDLTY 1+ULTRASOUND EA 15: CPT

## 2018-02-15 PROCEDURE — 97140 MANUAL THERAPY 1/> REGIONS: CPT

## 2018-02-15 NOTE — PROGRESS NOTES
PT DAILY TREATMENT NOTE     Patient Name: Lulu Arce  Date:2/15/2018  : 1995  [x]  Patient  Verified  Payor: BLUE CROSS / Plan: [facility] PPO / Product Type: PPO /    In time: 507     Out time: 540  Total Treatment Time (min): 33  Visit #: 5 of     Treatment Area: Rib pain on left side [R07.81]    SUBJECTIVE  Pain Level (0-10 scale): 5-6  Any medication changes, allergies to medications, adverse drug reactions, diagnosis change, or new procedure performed?: [x] No    [] Yes (see summary sheet for update)  Subjective functional status/changes:   [] No changes reported  \"Its the same.  \"    OBJECTIVE  Modality rationale: decrease inflammation, decrease pain and increase tissue extensibility to improve the patients ability to perform functional mobility and improve activity  endurance     Min Type Additional Details    [] Estim: []Att   []Unatt        []TENS instruct                  []IFC  []Premod   []NMES                     []Other:  []w/US   []w/ice   []w/heat  Position:  Location:    []  Traction: [] Cervical       []Lumbar                       [] Prone          []Supine                       []Intermittent   []Continuous Lbs:  [] before manual  [] after manual   10(8 min rx) [x]  Ultrasound: COMBO WITH HV ESTIM TO L RIB 8-10 Location:  W/cm2: 1.0    []  Iontophoresis with dexamethasone         Location: [] Take home patch   [] In clinic   PD []  Ice     [x]  heat  []  Ice massage Position: supine with LEs elevated on wedge  Location: (L) chest/rib    []  Vasopneumatic Device Pressure:       [] lo [] med [] hi   Temperature: [] lo [] med [] hi   [x] Skin assessment post-treatment:  [x]intact []redness- no adverse reaction       []redness  adverse reaction:       13 min Therapeutic Exercise:  [x] See flow sheet:    Rationale: increase ROM, increase strength and improve coordination to 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    10 min Manual Therapy: Taping for L rib contusion    Rationale: decrease pain, increase ROM, increase tissue extensibility, decrease trigger points and increase postural awareness to return to PLOF and swimming painfree          X min Patient Education: [x] Review HEP     Other Objective/Functional Measures:    LTG 4 - % improvement with PT : 0%     Pain Level (0-10 scale) post treatment: 5    ASSESSMENT/Changes in Function:   Compliant with all HEP , however patient has seen little to no change in pain. Attempted ES/US combo to area of pain today and initiated taping for pain relief for rib contusion. Patient is also being treated by ATC who has done US and ES and combo previously. Patient has had no imaging but will not return to referring MD till 3/12. Patient has no red flags for non musculoskeletal concerns, however would benefit from imaging. Pt does not have severe pain with US indicating no fracture. Pain with rib mobs present. Patient will continue to benefit from skilled PT services to modify and progress therapeutic interventions, address functional mobility deficits, address ROM deficits, address strength deficits, analyze and address soft tissue restrictions, analyze and cue movement patterns, analyze and modify body mechanics/ergonomics and assess and modify postural abnormalities to attain remaining goals. [x]  See Plan of Care  []  See progress note/recertification  []  See Discharge Summary         Progress towards goals / Updated goals:  Short Term Goals: To be accomplished in  1  weeks:  1. Pt will be independent and compliant with HEP Progressing 2-6-18  Long Term Goals: To be accomplished in  8-12  treatments:  1. Patient will increase FOTO score to 67/100 for indications of increased functional mobility. 2.  Patient will demo LT strength 4/5 for improve scapular strength with breast stroke   3. Patient will demo 5/5 MT strength for improved postural stability with sitting in class   4.   Patient will report 50% improvement in sx for progression of swimming quality for school scholarship goal not met - no change in sx   2/15/18    PLAN  [x]  Upgrade activities as tolerated     [x]  Continue plan of care  []  Update interventions per flow sheet       []  Discharge due to:_  [x]  Other:_assess response to taping and combo    Patient will be OOT next week for conference championship   Research rib mobilization for costochondritis        Daryle Cove, PT 2/15/2018

## 2018-02-27 ENCOUNTER — HOSPITAL ENCOUNTER (OUTPATIENT)
Dept: PHYSICAL THERAPY | Age: 23
End: 2018-02-27
Payer: COMMERCIAL

## 2018-03-01 ENCOUNTER — HOSPITAL ENCOUNTER (OUTPATIENT)
Dept: PHYSICAL THERAPY | Age: 23
Discharge: HOME | End: 2018-03-01
Payer: SELF-PAY

## 2018-03-01 PROCEDURE — 97110 THERAPEUTIC EXERCISES: CPT

## 2018-03-01 PROCEDURE — 97035 APP MDLTY 1+ULTRASOUND EA 15: CPT

## 2018-03-01 PROCEDURE — 97140 MANUAL THERAPY 1/> REGIONS: CPT

## 2018-03-01 NOTE — PROGRESS NOTES
PT DAILY TREATMENT NOTE 8    Patient Name: Ghazala Hamilton  Date:3/1/2018  : 1995  [x]  Patient  Verified  Payor: BLUE CROSS / Plan: [facility] PPO / Product Type: PPO /    In time: 10:00 am     Out time: 10:52 am  Total Treatment Time (min): 52  Visit #: 6 of -    Treatment Area: Rib pain on left side [R07.81]    SUBJECTIVE  Pain Level (0-10 scale): 6-7  Any medication changes, allergies to medications, adverse drug reactions, diagnosis change, or new procedure performed?: [x] No    [] Yes (see summary sheet for update)  Subjective functional status/changes:   [] No changes reported  \"A little worse today from the championships, but expected. I felt okay with taping last time. \"    OBJECTIVE  Modality rationale: decrease inflammation, decrease pain and increase tissue extensibility to improve the patients ability to perform functional mobility and improve activity  endurance     Min Type Additional Details    [] Estim: []Att   []Unatt        []TENS instruct                  []IFC  []Premod   []NMES                     []Other:  []w/US   []w/ice   []w/heat  Position:  Location:    []  Traction: [] Cervical       []Lumbar                       [] Prone          []Supine                       []Intermittent   []Continuous Lbs:  [] before manual  [] after manual   10 (8 min rx) [x]  Ultrasound Location: (L) costal cartilage of ribs T8-10  W/cm2: 1.2    []  Iontophoresis with dexamethasone         Location: [] Take home patch   [] In clinic   PD []  Ice     [x]  heat  []  Ice massage Position: supine with LEs elevated on wedge  Location: (L) chest/rib    []  Vasopneumatic Device Pressure:       [] lo [] med [] hi   Temperature: [] lo [] med [] hi   [x] Skin assessment post-treatment:  [x]intact []redness- no adverse reaction       []redness  adverse reaction:       32 min Therapeutic Exercise:  [x] See flow sheet:    Rationale: increase ROM, increase strength and improve coordination to 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    10 min Manual Therapy: SI check - (R) long, then longer - post inn rotation - attempted correction with MET, but minimal change; taping for L rib contusion, MWM into (R) T/S rotation in sitting   Rationale: decrease pain, increase ROM, increase tissue extensibility, decrease trigger points and increase postural awareness to return to PLOF and swimming painfree        X min Patient Education: [x] Review HEP     Other Objective/Functional Measures:    (L) MT strength: 4+/5   (L) LT strength: 3+/5   T/S rotation AROM: limited approx 50% to the (R) - normalized after MWM to ribs T8-9   Notes hx of low back pain addressed with MET and stretching. Pain Level (0-10 scale) post treatment: 5-6    ASSESSMENT/Changes in Function:   Patient will be out of swimming for the next week. Will assess sx after a week of rest as OTAs begin the following week. Patient will continue to benefit from skilled PT services to modify and progress therapeutic interventions, address functional mobility deficits, address ROM deficits, address strength deficits, analyze and address soft tissue restrictions, analyze and cue movement patterns, analyze and modify body mechanics/ergonomics and assess and modify postural abnormalities to attain remaining goals. [x]  See Plan of Care  []  See progress note/recertification  []  See Discharge Summary         Progress towards goals / Updated goals:  Short Term Goals: To be accomplished in  1  weeks:  1. Pt will be independent and compliant with HEP Progressing 2-6-18  Long Term Goals: To be accomplished in  8-12  treatments:  1. Patient will increase FOTO score to 67/100 for indications of increased functional mobility. 2.  Patient will demo LT strength 4/5 for improve scapular strength with breast stroke   3. Patient will demo 5/5 MT strength for improved postural stability with sitting in class   4.   Patient will report 50% improvement in sx for progression of swimming quality for school scholarship goal not met - no change in sx   2/15/18    PLAN  [x]  Upgrade activities as tolerated     [x]  Continue plan of care  []  Update interventions per flow sheet       []  Discharge due to:_  [x]  Other: assess response to tx and rest for 1 week away from competitive swimming    Annelise Liebermanchester, PTA 3/1/2018

## 2018-03-02 ENCOUNTER — HOSPITAL ENCOUNTER (OUTPATIENT)
Dept: PHYSICAL THERAPY | Age: 23
Discharge: HOME | End: 2018-03-02
Payer: SELF-PAY

## 2018-03-02 PROCEDURE — 97110 THERAPEUTIC EXERCISES: CPT

## 2018-03-02 PROCEDURE — 97140 MANUAL THERAPY 1/> REGIONS: CPT

## 2018-03-02 PROCEDURE — 97014 ELECTRIC STIMULATION THERAPY: CPT

## 2018-03-02 NOTE — PROGRESS NOTES
PT DAILY TREATMENT NOTE 8    Patient Name: Sudhir Byers  Date:3/2/2018  : 1995  [x]  Patient  Verified  Payor: BLUE CROSS / Plan: [facility] PPO / Product Type: PPO /    In time: 709 Out time:810  Total Treatment Time (min): 64  Visit #: 7 of     Treatment Area: Rib pain on left side [R07.81]    SUBJECTIVE  Pain Level (0-10 scale): 4-5  Any medication changes, allergies to medications, adverse drug reactions, diagnosis change, or new procedure performed?: [x] No    [] Yes (see summary sheet for update)  Subjective functional status/changes:   [] No changes reported  \"Its ok right now.'    OBJECTIVE  Modality rationale: decrease inflammation, decrease pain and increase tissue extensibility to improve the patients ability to perform functional mobility and improve activity  endurance     Min Type Additional Details   15 [x] Estim: VMS seq for m spasm  []w/US   [x]w/ice   []w/heat  Position: supine   Location: L rib cage     []  Traction: [] Cervical       []Lumbar                       [] Prone          []Supine                       []Intermittent   []Continuous Lbs:  [] before manual  [] after manual   NT [x]  Ultrasound Location: (L) costal cartilage of ribs T8-10  W/cm2: 1.2    []  Iontophoresis with dexamethasone         Location: [] Take home patch   [] In clinic   PD []  Ice     [x]  heat  []  Ice massage Position: supine with LEs elevated on wedge  Location: (L) chest/rib    []  Vasopneumatic Device Pressure:       [] lo [] med [] hi   Temperature: [] lo [] med [] hi   [x] Skin assessment post-treatment:  [x]intact []redness- no adverse reaction       []redness  adverse reaction:       31 min Therapeutic Exercise:  [x] See flow sheet:    Rationale: increase ROM, increase strength and improve coordination to 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    15 min Manual Therapy: SI check -R ant ilial rotation - corrected with MET and shot gun , L JOSE JUAN rib mob (see hard chart),  Seated TS rotation mobs grade 2-3, SL million dollar roll B    Rationale: decrease pain, increase ROM, increase tissue extensibility, decrease trigger points and increase postural awareness to return to PLOF and swimming painfree        X min Patient Education: [x] Review HEP - self MET      Other Objective/Functional Measures:    Goals assessed for PN   Pain at best 3/10, at worst 9/10  Subjective % improvement 0%  Objective:    TTP (significant) ribs 8-10   (L) MT strength: 4+/5   (L) LT strength:  3+/5   T/S rotation AROM: limited approx 50% to the (R) - normalized after MWM to ribs T8-9   Core instability - poor prone and lateral plank with core weakness noted   Improvements: none noted   Deficits laying on L side: intermittent sleep interruption, anything elevating HR/ increased breathing frequency, limited chest expansion, swimming- on scholarship, wt training      Pain Level (0-10 scale) post treatment: 3-4    ASSESSMENT/Changes in Function:   SEE PN . Initiated JOSE JUAN therex per hard chart     Patient will continue to benefit from skilled PT services to modify and progress therapeutic interventions, address functional mobility deficits, address ROM deficits, address strength deficits, analyze and address soft tissue restrictions, analyze and cue movement patterns, analyze and modify body mechanics/ergonomics and assess and modify postural abnormalities to attain remaining goals. [x]  See Plan of Care  [x]  See progress note/recertification  []  See Discharge Summary         Progress towards goals / Updated goals:  Short Term Goals: To be accomplished in  1  weeks:  1. Pt will be independent and compliant with HEP goal met - patient reports compliance with HEP  Long Term Goals: To be accomplished in  8-12  treatments:  1. Patient will increase FOTO score to 67/100 for indications of increased functional mobility.  goal progressing at 52/100  2.   Patient will demo LT strength 4/5 for improve scapular strength with breast stroke goal not met at 3+/5  3. Patient will demo 5/5 MT strength for improved postural stability with sitting in class goal not net at 4+/5  4.   Patient will report 50% improvement in sx for progression of swimming quality for school scholarship goal not met - 0% at this time but patient feels she wants to continued to find avenues for pain relief    PLAN  [x]  Upgrade activities as tolerated     [x]  Continue plan of care  []  Update interventions per flow sheet       []  Discharge due to:_  [x]  Other: SEE PN cont to mob rib and strengthen core and TS     Lilo Gonzalez, PT 3/2/2018

## 2018-03-02 NOTE — PROGRESS NOTES
[address] [facility] AT [address] [facility], [place] [address]  Phone: [phone] Fax: [phone]  PROGRESS NOTE   Patient Name: Caryle Katz : 1995   Treatment/Medical Diagnosis: Rib pain on left side [R07.81]   Referral Source: Gen Mari DO     Date of Initial Visit: 18 Attended Visits: 7 Missed Visits: 1     SUMMARY OF TREATMENT  Patient is being treated for co L sided rib pain starting 2-3 mo ago insidious onset with progressive worsening. Treatment has included progressive therex for scapular strengthening, manual, US, ES/US combo. CURRENT STATUS  Patient has made limited gains at this time with rib pain. Pain continues to be exacerbated by swimming, however patient is unable to rest from activity sec to recent championships. Patient has had 2 week rest as of yesterday, but will return to swimming next week for practice. Patient continues to demo weakness of LT and periscapular, poor core strength (for elite swimmer) and significant TTP of rib 8-10.  Other assessment as follows:    Pain at best 3/10, at worst 9/10  Subjective % improvement 0%  Objective:                         TTP (significant) ribs 8-10                        (L) MT strength: 4+/5                        (L) LT strength:  3+/5                        T/S rotation AROM: limited approx 50% to the (R) - normalized after MWM to ribs T8-9                        Core instability - poor prone and lateral plank with core weakness noted   Improvements: none noted   Deficits laying on L side: intermittent sleep interruption, anything elevating HR/ increased breathing frequency, limited chest expansion, swimming- on scholarship, wt training    Short Term Goals: To be accomplished in  1  weeks:  1.  Pt will be independent and compliant with HEP goal met - patient reports compliance with HEP  Long Term Goals: To be accomplished in  8-12  treatments:  1.  Patient will increase FOTO score to 67/100 for indications of increased functional mobility.  goal progressing at 52/100  2.  Patient will demo LT strength 4/5 for improve scapular strength with breast stroke goal not met at 3+/5  3. Patient will demo 5/5 MT strength for improved postural stability with sitting in class goal not net at 4+/5  4. Patient will report 50% improvement in sx for progression of swimming quality for school scholarship goal not met - 0% at this time but patient feels she wants to continued to find avenues for pain relief    New Goals to be achieved in __8-12__  treatments:  Cont per above unmet goals    G-Codes: na  RECOMMENDATIONS  Despite limited progress, patient would benefit from continued PT to modify program to focus on deficits associated with strength of TS and core and to continue to aim at pain relief. Cont 2-3 x 8-12. Add ionto to dex for localized anti inflammation. If you have any questions/comments please contact us directly at [phone]   Thank you for allowing us to assist in the care of your patient. Therapist Signature: Alden Taylor, PT Date: 3/2/2018     802am    NOTE TO PHYSICIAN:  PLEASE COMPLETE THE ORDERS BELOW AND FAX TO   [facility]: [phone]. If you are unable to process this request in 24 hours please contact our office: [phone].  x I have read the above report and request that my patient continue as recommended. I have read the above report and request that my patient continue therapy with the following changes/special instructions:_________________________________________________________    I have read the above report and request that my patient be discharged from therapy.      Physician Signature:        Date:       Time:

## 2018-03-12 ENCOUNTER — OFFICE VISIT (OUTPATIENT)
Dept: ORTHOPEDIC SURGERY | Age: 23
End: 2018-03-12

## 2018-03-12 VITALS
HEART RATE: 65 BPM | RESPIRATION RATE: 15 BRPM | WEIGHT: 158 LBS | HEIGHT: 64 IN | DIASTOLIC BLOOD PRESSURE: 68 MMHG | SYSTOLIC BLOOD PRESSURE: 106 MMHG | TEMPERATURE: 97.9 F | BODY MASS INDEX: 26.98 KG/M2

## 2018-03-12 DIAGNOSIS — G25.89 SCAPULAR DYSKINESIS: Primary | ICD-10-CM

## 2018-03-12 DIAGNOSIS — M99.01 CERVICAL SOMATIC DYSFUNCTION: ICD-10-CM

## 2018-03-12 DIAGNOSIS — M99.08 RIB CAGE REGION SOMATIC DYSFUNCTION: ICD-10-CM

## 2018-03-12 DIAGNOSIS — M99.02 THORACIC REGION SOMATIC DYSFUNCTION: ICD-10-CM

## 2018-03-12 NOTE — MR AVS SNAPSHOT
Nikki Johnson 
 
 
 [address] [facility], [address] [facility] [address] [address] 
[phone] Patient: Nilesh Miller MRN: [id number] :1995 Visit Information Date & Time Provider Department Dept. Phone Encounter #  
 3/12/2018  8:00 AM Rita Kunz, [facility] [phone] [id number] Follow-up Instructions Return in about 4 weeks (around 2018). Upcoming Health Maintenance Date Due Hepatitis A Peds Age 1-18 (1 of 2 - Standard Series) 10/20/1998 DTaP/Tdap/Td series (1 - Tdap) 10/20/2004 HPV AGE 9Y-26Y (1 of 3 - Female 3 Dose Series) 10/20/2008 Influenza Age 5 to Adult 2017 Allergies as of 3/12/2018  Review Complete On: 3/12/2018 By: Rita Kunz DO No Known Allergies Current Immunizations  Never Reviewed No immunizations on file. Not reviewed this visit You Were Diagnosed With   
  
 Codes Comments Scapular dyskinesis    -  Primary ICD-10-CM: G25.89 ICD-9-CM: 781. 3 Thoracic region somatic dysfunction     ICD-10-CM: M99.02 
ICD-9-CM: 739.2 Rib cage region somatic dysfunction     ICD-10-CM: M99.08 
ICD-9-CM: 739.8 Cervical somatic dysfunction     ICD-10-CM: M99.01 
ICD-9-CM: 739.1 Vitals BP Pulse Temp Resp Height(growth percentile) Weight(growth percentile) 106/68 65 97.9 °F (36.6 °C) 15 5' 4\" (1.626 m) 158 lb (71.7 kg) BMI OB Status Smoking Status 27.12 kg/m2 Having regular periods Never Smoker Vitals History BMI and BSA Data Body Mass Index Body Surface Area  
 27.12 kg/m 2 1.8 m 2 Preferred Pharmacy Pharmacy Name Phone [facility] [address] [facility], [address] [place] [address] [phone] Your Updated Medication List  
  
   
This list is accurate as of 3/12/18  8:28 AM.  Always use your most recent med list.  
  
  
  
  
 ibuprofen 200 mg Cap Take 200 mg by mouth as needed. meloxicam 15 mg tablet Commonly known as:  MOBIC Take 1 tab daily as needed pain with food. We Performed the Following CT OSTEOPATHIC MANIP,3-4 BODY REGN [id number] CPT(R)] Follow-up Instructions Return in about 4 weeks (around 4/9/2018). Introducing [facility] SERVICES! Elvi Lewis introduces [employer] patient portal. Now you can access parts of your medical record, email your doctor's office, and request medication refills online. 1. In your internet browser, go to [email or web address] 2. Click on the First Time User? Click Here link in the Sign In box. You will see the New Member Sign Up page. 3. Enter your [employer] Access Code exactly as it appears below. You will not need to use this code after youve completed the sign-up process. If you do not sign up before the expiration date, you must request a new code. · [employer] Access Code: [email or web address] Expires: 4/18/2018 10:41 AM 
 
4. Enter the last four digits of your Social Security Number (xxxx) and Date of Birth (mm/dd/yyyy) as indicated and click Submit. You will be taken to the next sign-up page. 5. Create a [employer] ID. This will be your [employer] login ID and cannot be changed, so think of one that is secure and easy to remember. 6. Create a [employer] password. You can change your password at any time. 7. Enter your Password Reset Question and Answer. This can be used at a later time if you forget your password. 8. Enter your e-mail address. You will receive e-mail notification when new information is available in [address] 9. Click Sign Up. You can now view and download portions of your medical record. 10. Click the Download Summary menu link to download a portable copy of your medical information. If you have questions, please visit the Frequently Asked Questions section of the [employer] website. Remember, [employer] is NOT to be used for urgent needs. For medical emergencies, dial 911. Now available from your iPhone and Android! Please provide this summary of care documentation to your next provider. Your primary care clinician is listed as NONE. If you have any questions after today's visit, please call [phone].

## 2018-03-12 NOTE — PROGRESS NOTES
HISTORY OF PRESENT ILLNESS    Jacques Dueñas is a 23y.o. year old female comes in today to be evaluated and treated for: rib pain    Since last appt has been doing well with PT and was able to swim 100m for conference tournament which she had won. Has improved some but on spring break last week so she went home to [place]. Usine mobic PRN which helps. Social History     Social History    Marital status: SINGLE     Spouse name: N/A    Number of children: N/A    Years of education: N/A     Social History Main Topics    Smoking status: Never Smoker    Smokeless tobacco: Never Used    Alcohol use No    Drug use: No    Sexual activity: Not Asked     Other Topics Concern    None     Social History Narrative     Current Outpatient Prescriptions   Medication Sig Dispense Refill    ibuprofen 200 mg cap Take 200 mg by mouth as needed.  meloxicam (MOBIC) 15 mg tablet Take 1 tab daily as needed pain with food. 90 Tab 0     Past Medical History:   Diagnosis Date    Arthritis      Family History   Problem Relation Age of Onset    No Known Problems Father          ROS:  No numb, tingle swell    Objective:  Visit Vitals    /68    Pulse 65    Temp 97.9 °F (36.6 °C)    Resp 15    Ht 5' 4\" (1.626 m)    Wt 158 lb (71.7 kg)    BMI 27.12 kg/m2     GEN:  Appears stated age in NAD. HEAD:  Normocephalic, Atraumatic. NEURO:  Sensation intact light touch upper and lower extremities. Biceps & Triceps reflexes +2/4 bilaterally. Patellar and Achilles +2/4  M/S:  Examined seating and supine. Spurling negative bilateral .  Cervical rotation Decreased right TTA at T2, 4,, 7 on right, worse with flexion. C3 left worse flexion. Rib(s) 2, 4 TTP on right. Strength +5/5 Bilateral upper and lower extremities. EXT  no clubbing/cyanosis. no edema. SKIN: Warm & dry w/o rash. Assessment/Plan:     ICD-10-CM ICD-9-CM    1. Scapular dyskinesis G25.89 781.3    2. Thoracic region somatic dysfunction M99.02 739.2    3.  Rib cage region somatic dysfunction M99.08 739.8    4. Cervical somatic dysfunction M99.01 739.1      Orders Placed This Encounter    [place] OSTEOPATHIC MANIP,3-4 BODY REGN         Patient verbalizes understanding of evaluation and plan. Verbal consent obtained. Cervical, Thoracic and Rib SD treated with ME and HVLA. Correction of previous malalignments verified after Tx. Pt tolerated well. Notes improvement of Sx and pain is now rated 0-1/10. HEP/stretches daily. Discussed stretching/strengthening/posture. Will continue PT and HEP w/ Mobic PRN and RTC 3-4 weeks. Discussed posture will improve scapular motion. Letter for no swim until f/u.

## 2018-03-12 NOTE — LETTER
NOTIFICATION RETURN TO WORK / SCHOOL 
 
3/12/2018 8:24 AM 
 
Ms. Antwan Mclean [facility] [phone] To Whom It May Concern: 
 
Antwan Mclean is currently under the care of [address] [place] [address]. She should not swim until she has followed up with me in 3-4 weeks. Hope to be improved and cleared then. If there are questions or concerns please have the patient contact our office.  
 
 
 
Sincerely, 
 
 
Robinson Mcclendon, DO

## 2018-03-13 ENCOUNTER — HOSPITAL ENCOUNTER (OUTPATIENT)
Dept: PHYSICAL THERAPY | Age: 23
Discharge: HOME | End: 2018-03-13
Payer: SELF-PAY

## 2018-03-13 PROCEDURE — 97033 APP MDLTY 1+IONTPHRSIS EA 15: CPT

## 2018-03-13 PROCEDURE — 97140 MANUAL THERAPY 1/> REGIONS: CPT

## 2018-03-13 PROCEDURE — 97014 ELECTRIC STIMULATION THERAPY: CPT

## 2018-03-13 PROCEDURE — 97110 THERAPEUTIC EXERCISES: CPT

## 2018-03-13 NOTE — PROGRESS NOTES
PT DAILY TREATMENT NOTE     Patient Name: Santy Gutierrez  Date:3/13/2018  : 1995  [x]  Patient  Verified  Payor: BLUE CROSS / Plan: [facility] PPO / Product Type: PPO /    In time: 155 Out time:248  Total Treatment Time (min): 53  Visit #: 1 of     Treatment Area: Rib pain on left side [R07.81]    SUBJECTIVE  Pain Level (0-10 scale): 2-3  Any medication changes, allergies to medications, adverse drug reactions, diagnosis change, or new procedure performed?: [x] No    [] Yes (see summary sheet for update)  Subjective functional status/changes:   [] No changes reported  \"I haven't been swimming because of spring break and the doctor is making me stop swimming for 1 mo.  \"    OBJECTIVE  Modality rationale: decrease inflammation, decrease pain and increase tissue extensibility to improve the patients ability to perform functional mobility and improve activity  endurance     Min Type Additional Details   8 [x] Estim Attended - using hand held stim unit - intensity 4, 4 hZ  []w/US   [x]w/ice   []w/heat  Position: supine   Location: L rib cage     []  Traction: [] Cervical       []Lumbar                       [] Prone          []Supine                       []Intermittent   []Continuous Lbs:  [] before manual  [] after manual   NT [x]  Ultrasound Location: (L) costal cartilage of ribs T8-10  W/cm2: 1.2   4 [x]  Iontophoresis with dexamethasone         Location: L rib  [x] Take home patch   [] In clinic   10 [x]  Ice     []  heat  []  Ice massage Position: supine with LEs elevated on wedge  Location: (L) chest/rib    []  Vasopneumatic Device Pressure:       [] lo [] med [] hi   Temperature: [] lo [] med [] hi   [x] Skin assessment post-treatment:  [x]intact []redness- no adverse reaction       []redness  adverse reaction:       19 min Therapeutic Exercise:  [x] See flow sheet:    Rationale: increase ROM, increase strength and improve coordination to 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    12 min Manual Therapy: SI check -R ant ilial rotation - corrected with MET and shot gun , L JOSE JUAN rib mob (see hard chart), IASTM to L interocastal and RA at rib insertion    Rationale: decrease pain, increase ROM, increase tissue extensibility, decrease trigger points and increase postural awareness to return to PLOF and swimming painfree        X min Patient Education: [x] Review HEP- MET for home      Other Objective/Functional Measures:    JOSE JUAN therex per last session    Long sit: + for R ant rotation - negative after MET   Prone Y and I - weakness      Pain Level (0-10 scale) post treatment: 2    ASSESSMENT/Changes in Function:  Patient not seen in 2 weeks sec to spring break. Patient saw DO yesterday who recommend 1 more month of no swimming. Patient reports \"my coaches are dealing with it. \" Continue signficant tenderness. Improved after attended ES and gentle  IASTM, but not fully resolved. Iniaited IONT to dex for localized anti inflam.   Rec abdominus tenderness with crunch. Persistent rotation - need for more core stability     Patient will continue to benefit from skilled PT services to modify and progress therapeutic interventions, address functional mobility deficits, address ROM deficits, address strength deficits, analyze and address soft tissue restrictions, analyze and cue movement patterns, analyze and modify body mechanics/ergonomics and assess and modify postural abnormalities to attain remaining goals. [x]  See Plan of Care  [x]  See progress note/recertification  []  See Discharge Summary         Progress towards goals / Updated goals: Cont per unmet goals as below per PN 3/13/18  Short Term Goals: To be accomplished in  1  weeks:  1. Pt will be independent and compliant with HEP goal met - patient reports compliance with HEP  Long Term Goals: To be accomplished in  8-12  treatments:  1.   Patient will increase FOTO score to 67/100 for indications of increased functional mobility.  goal progressing at 52/100  2. Patient will demo LT strength 4/5 for improve scapular strength with breast stroke goal not met at 3+/5  3. Patient will demo 5/5 MT strength for improved postural stability with sitting in class goal not net at 4+/5  4.   Patient will report 50% improvement in sx for progression of swimming quality for school scholarship goal not met - 0% at this time but patient feels she wants to continued to find avenues for pain relief    PLAN  [x]  Upgrade activities as tolerated     [x]  Continue plan of care  []  Update interventions per flow sheet       []  Discharge due to:_  [x]  Other: assess response to change in treatment include PAGE and ionto    Assess compliance to selF met    Add core strengthening     Rajesh Dixon, PT 3/13/2018

## 2018-03-16 ENCOUNTER — HOSPITAL ENCOUNTER (OUTPATIENT)
Dept: PHYSICAL THERAPY | Age: 23
Discharge: HOME | End: 2018-03-16
Payer: SELF-PAY

## 2018-03-16 PROCEDURE — 97032 APPL MODALITY 1+ESTIM EA 15: CPT

## 2018-03-16 PROCEDURE — 97140 MANUAL THERAPY 1/> REGIONS: CPT

## 2018-03-16 PROCEDURE — 97110 THERAPEUTIC EXERCISES: CPT

## 2018-03-16 PROCEDURE — 97033 APP MDLTY 1+IONTPHRSIS EA 15: CPT

## 2018-03-16 NOTE — PROGRESS NOTES
PT DAILY TREATMENT NOTE 8    Patient Name: Neeraj Akers  Date:3/16/2018  : 1995  [x]  Patient  Verified  Payor: SELF PAY / Plan: [facility] SELF PAY / Product Type: Self Pay /    In time: 7:00 am       Out time: 7:45 am  Total Treatment Time (min): 45  Visit #: 2 of     Treatment Area: Rib pain on left side [R07.81]    SUBJECTIVE  Pain Level (0-10 scale): 2-3  Any medication changes, allergies to medications, adverse drug reactions, diagnosis change, or new procedure performed?: [x] No    [] Yes (see summary sheet for update)  Subjective functional status/changes:   [] No changes reported  \"I am okay. The ionto was on for 6 hours. \"    OBJECTIVE  Modality rationale: decrease inflammation, decrease pain and increase tissue extensibility to improve the patients ability to perform functional mobility and improve activity  endurance     Min Type Additional Details   8 [x] Estim Attended - using hand held stim unit - intensity 4, 4 hZ  []w/US   []w/ice   []w/heat  Position: supine   Location: (L) rib cage     []  Traction: [] Cervical       []Lumbar                       [] Prone          []Supine                       []Intermittent   []Continuous Lbs:  [] before manual  [] after manual   NT [x]  Ultrasound Location: (L) costal cartilage of ribs T8-10  W/cm2: 1.2   8 with application [x]  Iontophoresis with dexamethasone         Location: L rib  [x] Take home patch   [] In clinic   NT [x]  Ice     []  heat  []  Ice massage Position: supine with LEs elevated on wedge  Location: (L) chest/rib    []  Vasopneumatic Device Pressure:       [] lo [] med [] hi   Temperature: [] lo [] med [] hi   [x] Skin assessment post-treatment:  [x]intact []redness- no adverse reaction       []redness  adverse reaction:       19 min Therapeutic Exercise:  [x] See flow sheet: added side-lying left extended FA adduction with right glute max; cont'd JOSE JUAN strengthening therex   Rationale: increase ROM, increase strength and improve coordination to 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 Patient does cont to demo significant challenge with long seated supported resisted pull backs with bilateral HG ER at 90 deg ABD, notes performing this with at home. Patient performing MET self-correct at home 1x daily. Patient will continue to benefit from skilled PT services to modify and progress therapeutic interventions, address functional mobility deficits, address ROM deficits, address strength deficits, analyze and address soft tissue restrictions, analyze and cue movement patterns, analyze and modify body mechanics/ergonomics and assess and modify postural abnormalities to attain remaining goals. [x]  See Plan of Care  [x]  See progress note/recertification  []  See Discharge Summary         Progress towards goals / Updated goals: Cont per unmet goals as below per PN 3/13/18  Short Term Goals: To be accomplished in  1  weeks:  1. Pt will be independent and compliant with HEP goal met - patient reports compliance with HEP  Long Term Goals: To be accomplished in  8-12  treatments:  1. Patient will increase FOTO score to 67/100 for indications of increased functional mobility.  goal progressing at 52/100  2. Patient will demo LT strength 4/5 for improve scapular strength with breast stroke goal not met at 3+/5  3.   Patient will demo 5/5 MT strength for improved postural stability with sitting in class goal not net at 4+/5  4.   Patient will report 50% improvement in sx for progression of swimming quality for school scholarship goal not met - 0% at this time but patient feels she wants to continued to find avenues for pain relief    PLAN  [x]  Upgrade activities as tolerated     [x]  Continue plan of care  []  Update interventions per flow sheet       []  Discharge due to:_  []  Other:_    Cam Bermudez, PTA 3/16/2018

## 2018-03-20 ENCOUNTER — HOSPITAL ENCOUNTER (OUTPATIENT)
Dept: PHYSICAL THERAPY | Age: 23
Discharge: HOME | End: 2018-03-20
Payer: SELF-PAY

## 2018-03-20 PROCEDURE — 97140 MANUAL THERAPY 1/> REGIONS: CPT

## 2018-03-20 PROCEDURE — 97110 THERAPEUTIC EXERCISES: CPT

## 2018-03-20 PROCEDURE — 97033 APP MDLTY 1+IONTPHRSIS EA 15: CPT

## 2018-03-20 NOTE — PROGRESS NOTES
PT DAILY TREATMENT NOTE     Patient Name: Nilesh Miller  Date:3/20/2018  : 1995  [x]  Patient  Verified  Payor: SELF PAY / Plan: [facility] SELF PAY / Product Type: Self Pay /    In time: 9:30 am       Out time: 10:18 am  Total Treatment Time (min): 48  Visit #: 3 of     Treatment Area: Rib pain on left side [R07.81]    SUBJECTIVE  Pain Level (0-10 scale): 3  Any medication changes, allergies to medications, adverse drug reactions, diagnosis change, or new procedure performed?: [x] No    [] Yes (see summary sheet for update)  Subjective functional status/changes:   [] No changes reported  \"I was able to play a little soccer for 5 minutes. \"    OBJECTIVE  Modality rationale: decrease inflammation, decrease pain and increase tissue extensibility to improve the patients ability to perform functional mobility and improve activity  endurance     Min Type Additional Details   3' / TC for manual [x] Estim Attended - using hand held stim unit - intensity 4, 4 hZ  []w/US   []w/ice   []w/heat  Position: supine   Location: (L) rib cage     []  Traction: [] Cervical       []Lumbar                       [] Prone          []Supine                       []Intermittent   []Continuous Lbs:  [] before manual  [] after manual   NT [x]  Ultrasound Location: (L) costal cartilage of ribs T8-10  W/cm2: 1.2   8 with application [x]  Iontophoresis with dexamethasone         Location: L rib  [x] Take home patch   [] In clinic   NT [x]  Ice     []  heat  []  Ice massage Position: supine with LEs elevated on wedge  Location: (L) chest/rib    []  Vasopneumatic Device Pressure:       [] lo [] med [] hi   Temperature: [] lo [] med [] hi   [x] Skin assessment post-treatment:  [x]intact []redness- no adverse reaction       []redness  adverse reaction:       20 min Therapeutic Exercise:  [x] See flow sheet: added right side-lying weighted lift HG ER with left gluteus medius   Rationale: increase ROM, increase strength and improve coordination to 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    17 min Manual Therapy: SI check - very minimal (R) ant ilial rotation - corrected with  shot gun alone, QL stretching bilaterally, STM to (L) obliques   Rationale: decrease pain, increase ROM, increase tissue extensibility, decrease trigger points and increase postural awareness to return to PLOF and swimming painfree        X min Patient Education: [x] Review HEP     Other Objective/Functional Measures:     SI check - symmetrical following shot gun; pt notes she had been self-correcting 1x daily    Pain Level (0-10 scale) post treatment: 3-4    ASSESSMENT/Changes in Function:    Patient maintaining near neutral pelvic alignment. Improved scapular strength with 90/90 ER (near full ER) and pt notes performing this with her [employer] trainers. Cont's with rib flare on the (L) and held further hand-held E-stim sec patient request despite significant hypersensitivity between ribs T9-T10 - addressed well with manual interventions. Significant fatigue with glut med therex. Patient will continue to benefit from skilled PT services to modify and progress therapeutic interventions, address functional mobility deficits, address ROM deficits, address strength deficits, analyze and address soft tissue restrictions, analyze and cue movement patterns, analyze and modify body mechanics/ergonomics and assess and modify postural abnormalities to attain remaining goals. [x]  See Plan of Care  [x]  See progress note/recertification  []  See Discharge Summary         Progress towards goals / Updated goals: Cont per unmet goals as below per PN 3/13/18  Short Term Goals: To be accomplished in  1  weeks:  1. Pt will be independent and compliant with HEP goal met - patient reports compliance with HEP  Long Term Goals: To be accomplished in  8-12  treatments:  1. Patient will increase FOTO score to 67/100 for indications of increased functional mobility. goal progressing at 52/100  2. Patient will demo LT strength 4/5 for improve scapular strength with breast stroke goal not met at 3+/5  3. Patient will demo 5/5 MT strength for improved postural stability with sitting in class goal not net at 4+/5  4.   Patient will report 50% improvement in sx for progression of swimming quality for school scholarship goal not met - 0% at this time but patient feels she wants to continued to find avenues for pain relief    PLAN  [x]  Upgrade activities as tolerated     [x]  Continue plan of care  []  Update interventions per flow sheet       []  Discharge due to:_  [x]  Other: assess progress towards goals NV; pt may benefit from addition of standing resisted pull in left AF IR to address scap strength for breast stroke    Kavya Gannon, PTA 3/20/2018

## 2018-03-22 ENCOUNTER — HOSPITAL ENCOUNTER (OUTPATIENT)
Dept: PHYSICAL THERAPY | Age: 23
Discharge: HOME | End: 2018-03-22
Payer: SELF-PAY

## 2018-03-22 PROCEDURE — 97033 APP MDLTY 1+IONTPHRSIS EA 15: CPT

## 2018-03-22 PROCEDURE — 97140 MANUAL THERAPY 1/> REGIONS: CPT

## 2018-03-22 PROCEDURE — 97110 THERAPEUTIC EXERCISES: CPT

## 2018-03-22 NOTE — PROGRESS NOTES
PT DAILY TREATMENT NOTE     Patient Name: Mary Li  Date:3/22/2018  : 1995  [x]  Patient  Verified  Payor: SELF PAY / Plan: [facility] SELF PAY / Product Type: Self Pay /    In time: 9:02 am       Out time: 10:00 am  Total Treatment Time (min): 58  Visit #: 4 of     Treatment Area: Rib pain on left side [R07.81]    SUBJECTIVE  Pain Level (0-10 scale): 3-4  Any medication changes, allergies to medications, adverse drug reactions, diagnosis change, or new procedure performed?: [x] No    [] Yes (see summary sheet for update)  Subjective functional status/changes:   [] No changes reported  \"The trainers want to know what I can do and what to not do. \"    OBJECTIVE  Modality rationale: decrease inflammation, decrease pain and increase tissue extensibility to improve the patients ability to perform functional mobility and improve activity  endurance     Min Type Additional Details   3' / H [x] Estim Attended - using hand held stim unit - intensity 4, 4 hZ  []w/US   []w/ice   []w/heat  Position: supine   Location: (L) rib cage     []  Traction: [] Cervical       []Lumbar                       [] Prone          []Supine                       []Intermittent   []Continuous Lbs:  [] before manual  [] after manual    []  Ultrasound Location:  W/cm2:    8 with application [x]  Iontophoresis with dexamethasone         Location: L rib  [x] Take home patch   [] In clinic    []  Ice     []  heat  []  Ice massage Position:   Location:     []  Vasopneumatic Device Pressure:       [] lo [] med [] hi   Temperature: [] lo [] med [] hi   [x] Skin assessment post-treatment:  [x]intact []redness- no adverse reaction       []redness  adverse reaction:       37 min Therapeutic Exercise:  [x] See flow sheet: added std resisted pulls with shoulder IR to progress strength for return to swimming (butterfly)   Rationale: increase ROM, increase strength and improve coordination to 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    10 min Manual Therapy: SI check -  (R) ant ilial rotation with upslip - corrected with MET and shot gun, LE traction hold to address upslip, STM to (L) obliques   Rationale: decrease pain, increase ROM, increase tissue extensibility, decrease trigger points and increase postural awareness to return to PLOF and swimming painfree        X min Patient Education: [x] Review HEP - provided JOSE JUAN handout and encouraged hold from bike     Other Objective/Functional Measures:    Cont's to req VCs to avoid thoracic extension (to avoid exacerbation of rib flare) in substitution for shoulder ER at 90/90 position; able to correct but with quick onset of fatigue. Pain Level (0-10 scale) post treatment: 0    ASSESSMENT/Changes in Function:    Patient demos improved scapular control and reduced rib flare. Cont's to report baseline 2-4/10 pain but has avoided swimming per MD orders. Cont's to demo (R) ant ilial rotation with upslip but corrected with MET (pt notes performing this with  at school to maintain level pelvis as much as possible). Patient will require further scapular strengthening to progress towards safe return to swimming. Patient will continue to benefit from skilled PT services to modify and progress therapeutic interventions, address functional mobility deficits, address ROM deficits, address strength deficits, analyze and address soft tissue restrictions, analyze and cue movement patterns, analyze and modify body mechanics/ergonomics and assess and modify postural abnormalities to attain remaining goals. [x]  See Plan of Care  [x]  See progress note/recertification  []  See Discharge Summary         Progress towards goals / Updated goals: Cont per unmet goals as below per PN 3/13/18  Short Term Goals: To be accomplished in  1  weeks:  1. Pt will be independent and compliant with HEP goal met - patient reports compliance with HEP  Long Term Goals:  To be accomplished in  8-12 treatments:  1. Patient will increase FOTO score to 67/100 for indications of increased functional mobility.  goal progressing at 52/100  2. Patient will demo LT strength 4/5 for improve scapular strength with breast stroke goal not met at 3+/5  3. Patient will demo 5/5 MT strength for improved postural stability with sitting in class. -Goal progressing; improved scapular control with scap therex (3/22/18)  4.   Patient will report 50% improvement in sx for progression of swimming quality for school scholarship goal not met - 0% at this time but patient feels she wants to continued to find avenues for pain relief    PLAN  [x]  Upgrade activities as tolerated     [x]  Continue plan of care  []  Update interventions per flow sheet       []  Discharge due to:_  [x]  Other: assess scapular strength    Tamiko Gomes PTA 3/22/2018

## 2018-03-27 ENCOUNTER — HOSPITAL ENCOUNTER (OUTPATIENT)
Dept: PHYSICAL THERAPY | Age: 23
Discharge: HOME | End: 2018-03-27
Payer: SELF-PAY

## 2018-03-27 PROCEDURE — 97140 MANUAL THERAPY 1/> REGIONS: CPT

## 2018-03-27 PROCEDURE — 97014 ELECTRIC STIMULATION THERAPY: CPT

## 2018-03-27 PROCEDURE — 97110 THERAPEUTIC EXERCISES: CPT

## 2018-03-27 PROCEDURE — 97033 APP MDLTY 1+IONTPHRSIS EA 15: CPT

## 2018-03-27 NOTE — PROGRESS NOTES
PT DAILY TREATMENT NOTE     Patient Name: Abbie Fabian  Date:3/27/2018  : 1995  [x]  Patient  Verified  Payor: SELF PAY / Plan: [facility] SELF PAY / Product Type: Self Pay /    In time: 9:02 am       Out time:  9:55 am  Total Treatment Time (min): 53  Visit #: 5 of     Treatment Area: Rib pain on left side [R07.81]    SUBJECTIVE  Pain Level (0-10 scale): 3  Any medication changes, allergies to medications, adverse drug reactions, diagnosis change, or new procedure performed?: [x] No    [] Yes (see summary sheet for update)  Subjective functional status/changes:   [] No changes reported  \"Less pain today.  I have trouble sitting for a long time leaning on that side, but overall no pain at rest.\"    OBJECTIVE  Modality rationale: decrease inflammation, decrease pain and increase tissue extensibility to improve the patients ability to perform functional mobility and improve activity  endurance     Min Type Additional Details   8 [x] Estim Attended - using hand held stim unit - intensity 4, 3 hZ  []w/US   []w/ice   []w/heat  Position: supine   Location: (L) obliques at rib cage    []  Traction: [] Cervical       []Lumbar                       [] Prone          []Supine                       []Intermittent   []Continuous Lbs:  [] before manual  [] after manual    []  Ultrasound Location:  W/cm2:    8 with application [x]  Iontophoresis with dexamethasone         Location: (L) rib  [x] Take home patch   [] In clinic    []  Ice     []  heat  []  Ice massage Position:   Location:     []  Vasopneumatic Device Pressure:       [] lo [] med [] hi   Temperature: [] lo [] med [] hi   [x] Skin assessment post-treatment:  [x]intact []redness- no adverse reaction       []redness  adverse reaction:       27 min Therapeutic Exercise:  [x] See flow sheet: progressed PRE with JOSE JUAN therex   Rationale: increase ROM, increase strength and improve coordination to 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    10 min Manual Therapy: SI check -  (R) ant ilial rotation with upslip - corrected with MET and shot gun, LE traction hold to address upslip, (B) QL rotational stretch; STM to (L) obliques   Rationale: decrease pain, increase ROM, increase tissue extensibility, decrease trigger points and increase postural awareness to return to PLOF and swimming painfree        X min Patient Education: [x] Review HEP - hold from roller coasters which place holster onto chest     Other Objective/Functional Measures:   Scapular strength:   MT (L) 4+/5  LT (L) 4/5  % improvement: 50%    Pain Level (0-10 scale) post treatment: 2-3    ASSESSMENT/Changes in Function:    Patient making steady progress towards goals as assessed below. Cont's with quick onset of fatigue with scapular therex and demos cont'd (R) ant ilial rotation corrected with MET. Patient cont'ing to hold from swimming per MD and PTA recommendations. Patient will continue to benefit from skilled PT services to modify and progress therapeutic interventions, address functional mobility deficits, address ROM deficits, address strength deficits, analyze and address soft tissue restrictions, analyze and cue movement patterns, analyze and modify body mechanics/ergonomics and assess and modify postural abnormalities to attain remaining goals. [x]  See Plan of Care  [x]  See progress note/recertification  []  See Discharge Summary         Progress towards goals / Updated goals: Cont per unmet goals as below per PN 3/13/18  Short Term Goals: To be accomplished in  1  weeks:  1. Pt will be independent and compliant with HEP goal met - patient reports compliance with HEP  Long Term Goals: To be accomplished in  8-12  treatments:  1. Patient will increase FOTO score to 67/100 for indications of increased functional mobility.  goal progressing at 52/100  2.   Patient will demo LT strength 4/5 for improve scapular strength with breast stroke -Goal met; 4/5 LT strength (3/27/18)  3. Patient will demo 5/5 MT strength for improved postural stability with sitting in class. -Goal progressing; 4+/5 MT strength (3/27/18)   4.   Patient will report 50% improvement in sx for progression of swimming quality for school scholarship. -Goal met; pt notes 50% improvement with daily activities (3/27/18)    PLAN  [x]  Upgrade activities as tolerated     [x]  Continue plan of care  []  Update interventions per flow sheet       []  Discharge due to:_  []  Other:_    Cisco Clifton, PTA 3/27/2018

## 2018-03-29 ENCOUNTER — HOSPITAL ENCOUNTER (OUTPATIENT)
Dept: PHYSICAL THERAPY | Age: 23
Discharge: HOME | End: 2018-03-29
Payer: SELF-PAY

## 2018-03-29 PROCEDURE — 97035 APP MDLTY 1+ULTRASOUND EA 15: CPT

## 2018-03-29 PROCEDURE — 97110 THERAPEUTIC EXERCISES: CPT

## 2018-03-29 PROCEDURE — 97140 MANUAL THERAPY 1/> REGIONS: CPT

## 2018-03-29 NOTE — PROGRESS NOTES
PT DAILY TREATMENT NOTE     Patient Name: Sangeeta Quick  Date:3/29/2018  : 1995  [x]  Patient  Verified  Payor: SELF PAY / [facility] / Product Type: Self Pay /    In time: 9:00 am       Out time:  9:58 am  Total Treatment Time (min): 58  Visit #: 6 of     Treatment Area: Rib pain on left side [R07.81]    SUBJECTIVE  Pain Level (0-10 scale): 2-3  Any medication changes, allergies to medications, adverse drug reactions, diagnosis change, or new procedure performed?: [x] No    [] Yes (see summary sheet for update)  Subjective functional status/changes:   [] No changes reported  \"Went to [facility] and did a few roller coaster and felt okay. It still hurts when I breathe deeply. \"    OBJECTIVE  Modality rationale: decrease inflammation, decrease pain and increase tissue extensibility to improve the patients ability to perform functional mobility and improve activity  endurance     Min Type Additional Details   NT [x] Estim Attended - using hand held stim unit - intensity 4, 3 hZ  []w/US   []w/ice   []w/heat  Position: supine   Location: (L) obliques at rib cage    []  Traction: [] Cervical       []Lumbar                       [] Prone          []Supine                       []Intermittent   []Continuous Lbs:  [] before manual  [] after manual   8 [x]  Ultrasound   []Continuous   [x] Pulsed                           []1MHz   [x]3MHz Location: (L) costal cartilage T8-10  W/cm2: 1.2   NT [x]  Iontophoresis with dexamethasone         Location: (L) rib  [x] Take home patch   [] In clinic    []  Ice     []  heat  []  Ice massage Position:   Location:     []  Vasopneumatic Device Pressure:       [] lo [] med [] hi   Temperature: [] lo [] med [] hi   [x] Skin assessment post-treatment:  [x]intact []redness- no adverse reaction       []redness  adverse reaction:       42 min Therapeutic Exercise:  [x] See flow sheet: added diaphragmatic breathing and supine YMB scap clocks CCW/CW   Rationale: increase ROM, increase strength and improve coordination to 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    8 min Manual Therapy: SI check -  (R) post ilial rotation - corrected with MET and shot gun (cavitation noted today); QL rotational stretch   Rationale: decrease pain, increase ROM, increase tissue extensibility, decrease trigger points and increase postural awareness to return to PLOF and swimming painfree        X min Patient Education: [x] Review HEP - add diaphragmatic breathing and scap clocks in supine     Other Objective/Functional Measures:       Pain Level (0-10 scale) post treatment: 0; soreness in (L) UT    ASSESSMENT/Changes in Function:    Patient demos (R) post ilial rotation today (pelvic laxity) corrected fully with MET and shotgun with cavitation noted. Able to demo deep diaphragmatic breathing without inc pain, but does demo difficulty achieving proper diaphragmatic movement. Patient will require further scapular retraining to improve dyskinesia. Patient will continue to benefit from skilled PT services to modify and progress therapeutic interventions, address functional mobility deficits, address ROM deficits, address strength deficits, analyze and address soft tissue restrictions, analyze and cue movement patterns, analyze and modify body mechanics/ergonomics and assess and modify postural abnormalities to attain remaining goals. [x]  See Plan of Care  [x]  See progress note/recertification  []  See Discharge Summary         Progress towards goals / Updated goals: Cont per unmet goals as below per PN 3/13/18  Short Term Goals: To be accomplished in  1  weeks:  1. Pt will be independent and compliant with HEP goal met - patient reports compliance with HEP  Long Term Goals: To be accomplished in  8-12  treatments:  1. Patient will increase FOTO score to 67/100 for indications of increased functional mobility.  goal progressing at 52/100  2.   Patient will demo LT strength 4/5 for improve scapular strength with breast stroke -Goal met; 4/5 LT strength (3/27/18)  3. Patient will demo 5/5 MT strength for improved postural stability with sitting in class. -Goal progressing; 4+/5 MT strength (3/27/18)   4.   Patient will report 50% improvement in sx for progression of swimming quality for school scholarship. -Goal met; pt notes 50% improvement with daily activities (3/27/18)    PLAN  [x]  Upgrade activities as tolerated     [x]  Continue plan of care  []  Update interventions per flow sheet       []  Discharge due to:_  []  Other:_    Sussy Gusman, PTA 3/29/2018

## 2018-04-03 ENCOUNTER — HOSPITAL ENCOUNTER (OUTPATIENT)
Dept: PHYSICAL THERAPY | Age: 23
Discharge: HOME | End: 2018-04-03
Payer: SELF-PAY

## 2018-04-03 PROCEDURE — 97035 APP MDLTY 1+ULTRASOUND EA 15: CPT

## 2018-04-03 PROCEDURE — 97140 MANUAL THERAPY 1/> REGIONS: CPT

## 2018-04-03 PROCEDURE — 97110 THERAPEUTIC EXERCISES: CPT

## 2018-04-03 NOTE — PROGRESS NOTES
[address] [facility] [address] [place] [address]  Phone: [phone] Fax: [phone]  PROGRESS NOTE  Patient Name: Edwin Mace : 1995   Treatment/Medical Diagnosis: Rib pain on left side [R07.81]   Referral Source: Da Nazario DO     Date of Initial Visit: 18 Attended Visits: 14 Missed Visits: 0     SUMMARY OF TREATMENT  Patient is being treated for co L sided rib pain starting 2-3 mo ago insidious onset with progressive worsening. Treatment has included JOSE JUAN therex for scapular and thoracic strengthening, manual therapy for pelvic correction, thoracic mobilization, manual release/stretching, US to reduce inflammation; IONTO and ES/US combo for pain relief. CURRENT STATUS  Patient has made slow, steady progress in PT. Patient notes reduced average pain levels to 2-3/10 versus 6-8/10 at IE. Thoracic mobility has improved in both directions and patient demos inc scapular strength and reduced rib flare. Subjective improvement of 87% in symptoms since IE reported. Objective assessment as follows:  Improvements: \"doesn't hurt as much anymore,\" driving, prolonged sitting, walking, cooking/baking  Deficits: (L) S/L sleeping, recreational activities (holding from swimming / soccer), activities elevating respiration rate  FOTO score: 62/100 (at last assessment, 52/100)  T/S AROM rotation: 100% in both directions after SIJ correction  Scapular strength: SA/MT 4+/5, LT 4/5  Core strength: 100% bridge, 30 second side plank limited by fatigue  Pain: (B) 2, (W) 4    Goal/Measure of Progress   1.  Patient will increase FOTO score to 67/100 for indications of increased functional mobility.  -Goal progressing; inc to 62/100 from 52/100 at last assessment  2.  Patient will demo LT strength 4/5 for improve scapular strength with breast stroke -Goal met; 4/5 LT strength (3/27/18)  3.   Patient will demo 5/5 MT strength for improved postural stability with sitting in class. -Goal progressing; 4+/5 MT strength  4. Patient will report 50% improvement in sx for progression of swimming quality for school scholarship. -Goal met; pt notes 87% improvement with daily activities    New Goals to be achieved in __4__  treatments:  1. Patient will increase FOTO score to 67/100 for indications of increased functional mobility. 2.  Patient will demo LT strength 4+/5 for improve scapular strength with breast stroke  3. Patient will demo SA strength 5/5 to improve scapular stability with freestyle stroke. 4.  Patient will demo (-) pelvic obliquity for 2 consecutive treatments to improve pelvic stability for safe return to swimming. RECOMMENDATIONS  Recommend continue skilled PT for 1x4 to address deficits and achieve aforementioned goals. If you have any questions/comments please contact us directly at [phone]. Thank you for allowing us to assist in the care of your patient. LPTA Signature: Ananda Osborne [place]  Date: 4/3/2018   PT Signature: [facility] Time: 4:35 PM   NOTE TO PHYSICIAN:  PLEASE COMPLETE THE ORDERS BELOW AND FAX TO   [facility]: [phone]. If you are unable to process this request in 24 hours please contact our office: [phone]. _x__ I have read the above report and request that my patient continue as recommended.   ___ I have read the above report and request that my patient continue therapy with the following changes/special instructions:_________________________________________________________   ___ I have read the above report and request that my patient be discharged from therapy.      Physician Signature:        Date:       Time:

## 2018-04-03 NOTE — PROGRESS NOTES
PT DAILY TREATMENT NOTE     Patient Name: Silvia Wright  Date:4/3/2018  : 1995  [x]  Patient  Verified  Payor: BLUE CROSS / Plan: [facility] PPO / Product Type: PPO /    In time: 9:00 am       Out time:  9:55 am  Total Treatment Time (min): 55  Visit #: 7 of     Treatment Area: Rib pain on left side [R07.81]    SUBJECTIVE  Pain Level (0-10 scale): 2  Any medication changes, allergies to medications, adverse drug reactions, diagnosis change, or new procedure performed?: [x] No    [] Yes (see summary sheet for update)  Subjective functional status/changes:   [] No changes reported  \"Not too bad today, but I ran a little to play soccer and had to stop. \"    OBJECTIVE  Modality rationale: decrease inflammation, decrease pain and increase tissue extensibility to improve the patients ability to perform functional mobility and improve activity  endurance     Min Type Additional Details   NT [x] Estim Attended - using hand held stim unit - intensity 4, 3 hZ  []w/US   []w/ice   []w/heat  Position: supine   Location: (L) obliques at rib cage    []  Traction: [] Cervical       []Lumbar                       [] Prone          []Supine                       []Intermittent   []Continuous Lbs:  [] before manual  [] after manual   8 [x]  Ultrasound   []Continuous   [x] Pulsed                           []1MHz   [x]3MHz Location: (L) costal cartilage T8-10  W/cm2: 1.2   NT [x]  Iontophoresis with dexamethasone         Location: (L) rib  [x] Take home patch   [] In clinic    []  Ice     []  heat  []  Ice massage Position:   Location:     []  Vasopneumatic Device Pressure:       [] lo [] med [] hi   Temperature: [] lo [] med [] hi   [x] Skin assessment post-treatment:  [x]intact []redness- no adverse reaction       []redness  adverse reaction:       39 min Therapeutic Exercise:  [x] See flow sheet: added wall V's with BTB   Rationale: increase ROM, increase strength and improve coordination to 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    8 min Manual Therapy: SI check -  (R) post ilial rotation - corrected with MET and shot gun (no cavitation noted)   Rationale: decrease pain, increase ROM, increase tissue extensibility, decrease trigger points and increase postural awareness to return to PLOF and swimming painfree        X min Patient Education: [x] Review HEP     Other Objective/Functional Measures:   Subjective improvement of 87% in symptoms since IE reported. Improvements: \"doesn't hurt as much anymore,\" driving, prolonged sitting, walking, cooking/baking  Deficits: (L) S/L sleeping, recreational activities (holding from swimming / soccer), activities elevating respiration rate  FOTO score: 62/100 (at last assessment, 52/100)  T/S AROM rotation: 100% in both directions after SIJ correction  Scapular strength: MT 4+/5, LT 4/5  Core strength: 100% bridge  Pain: (B) 2, (W) 4    Pain Level (0-10 scale) post treatment: 2    ASSESSMENT/Changes in Function:    See PN. Patient will continue to benefit from skilled PT services to modify and progress therapeutic interventions, address functional mobility deficits, address ROM deficits, address strength deficits, analyze and address soft tissue restrictions, analyze and cue movement patterns, analyze and modify body mechanics/ergonomics and assess and modify postural abnormalities to attain remaining goals. []  See Plan of Care  [x]  See progress note/recertification (3/9/01)  []  See Discharge Summary         Progress towards goals / Updated goals: Cont per unmet goals as below per PN 3/13/18  Short Term Goals: To be accomplished in  1  weeks:  1. Pt will be independent and compliant with HEP goal met - patient reports compliance with HEP  Long Term Goals: To be accomplished in  8-12  treatments:  1.   Patient will increase FOTO score to 67/100 for indications of increased functional mobility.  -Goal progressing; inc to 62/100 from 52/100 at last assessment  2. Patient will demo LT strength 4/5 for improve scapular strength with breast stroke -Goal met; 4/5 LT strength (3/27/18)  3. Patient will demo 5/5 MT strength for improved postural stability with sitting in class. -Goal progressing; 4+/5 MT strength  4.   Patient will report 50% improvement in sx for progression of swimming quality for school scholarship. -Goal met; pt notes 87% improvement with daily activities    PLAN  [x]  Upgrade activities as tolerated     [x]  Continue plan of care  []  Update interventions per flow sheet       []  Discharge due to:_  [x]  Other: see PN; cont 1x4    Sammi Wilhelm, PTA 4/3/2018

## 2018-04-05 ENCOUNTER — HOSPITAL ENCOUNTER (OUTPATIENT)
Dept: PHYSICAL THERAPY | Age: 23
Discharge: HOME | End: 2018-04-05
Payer: SELF-PAY

## 2018-04-05 PROCEDURE — 97140 MANUAL THERAPY 1/> REGIONS: CPT

## 2018-04-05 PROCEDURE — 97035 APP MDLTY 1+ULTRASOUND EA 15: CPT

## 2018-04-05 PROCEDURE — 97110 THERAPEUTIC EXERCISES: CPT

## 2018-04-16 ENCOUNTER — OFFICE VISIT (OUTPATIENT)
Dept: ORTHOPEDIC SURGERY | Age: 23
End: 2018-04-16

## 2018-04-16 VITALS
BODY MASS INDEX: 27.83 KG/M2 | DIASTOLIC BLOOD PRESSURE: 67 MMHG | WEIGHT: 163 LBS | SYSTOLIC BLOOD PRESSURE: 111 MMHG | TEMPERATURE: 98.6 F | HEART RATE: 65 BPM | RESPIRATION RATE: 15 BRPM | HEIGHT: 64 IN

## 2018-04-16 DIAGNOSIS — M99.08 RIB CAGE REGION SOMATIC DYSFUNCTION: ICD-10-CM

## 2018-04-16 DIAGNOSIS — M99.02 THORACIC REGION SOMATIC DYSFUNCTION: ICD-10-CM

## 2018-04-16 DIAGNOSIS — M99.01 CERVICAL SOMATIC DYSFUNCTION: ICD-10-CM

## 2018-04-16 DIAGNOSIS — G25.89 SCAPULAR DYSKINESIS: Primary | ICD-10-CM

## 2018-04-16 NOTE — PROGRESS NOTES
HISTORY OF PRESENT ILLNESS    Marilyn is a 23y.o. year old female comes in today to be evaluated and treated for: rib pain    Since last appt pain down to 3/10 and doing well with PT and HEP but planning to go to [place] for the summer. Only one practice for swim at [facility] until fall semester. Social History     Social History    Marital status: SINGLE     Spouse name: N/A    Number of children: N/A    Years of education: N/A     Social History Main Topics    Smoking status: Never Smoker    Smokeless tobacco: Never Used    Alcohol use No    Drug use: No    Sexual activity: Not Asked     Other Topics Concern    None     Social History Narrative     Current Outpatient Prescriptions   Medication Sig Dispense Refill    ibuprofen 200 mg cap Take 200 mg by mouth as needed.  meloxicam (MOBIC) 15 mg tablet Take 1 tab daily as needed pain with food. 80 Tab 0     Past Medical History:   Diagnosis Date    Arthritis      Family History   Problem Relation Age of Onset    No Known Problems Father          ROS:  No numb, tingle, swell, bruise    Objective:  Visit Vitals    /67    Pulse 65    Temp 98.6 °F (37 °C)    Resp 15    Ht 5' 4\" (1.626 m)    Wt 163 lb (73.9 kg)    BMI 27.98 kg/m2     GEN:  Appears stated age in NAD. HEAD:  Normocephalic, Atraumatic. NEURO:  Sensation intact light touch upper and lower extremities.  Biceps & Triceps reflexes +2/4 bilaterally.  Patellar and Achilles +2/4  M/S:  Examined seating and supine.  Spurling negative bilateral .  Cervical rotation Decreased right TTA at T3, 5, 7 on right, worse with flexion.  C3 left worse flexion.  Rib(s) 3, 4, 7 TTP on right. Strength +5/5 Bilateral upper and lower extremities. EXT  no clubbing/cyanosis.  no edema. SKIN: Warm & dry w/o rash. Assessment/Plan:     ICD-10-CM ICD-9-CM    1. Scapular dyskinesis G25.89 781.3    2.  Thoracic region somatic dysfunction M99.02 739.2 [place] OSTEOPATHIC MANIP,3-4 BODY REGN   3. Cervical somatic dysfunction M99.01 739.1 [place] OSTEOPATHIC MANIP,3-4 BODY REGN   4. Rib cage region somatic dysfunction M99.08 739.8 [place] OSTEOPATHIC MANIP,3-4 BODY REGN       Patient verbalizes understanding of evaluation and plan. Verbal consent obtained. Cervical, Thoracic and Rib SD treated with ME and HVLA. Correction of previous malalignments verified after Tx. Pt tolerated well. Notes improvement of Sx and pain is now rated 0-1/10. HEP/stretches daily. Discussed stretching/strengthening/posture. Will continue HEP/PT with gradual return to swimming and RTC if needed. Time with Pt 26 minutes, >50% of which was counseling pt regarding Dx and Tx options and coordination of care.

## 2018-04-16 NOTE — MR AVS SNAPSHOT
[place] [address], [address] [facility] [address] [address] 
[phone] Patient: Silvia Wright MRN: [id number] :1995 Visit Information Date & Time Provider Department Dept. Phone Encounter #  
 2018  1:20 PM Elsa Renae, [address] [facility] [phone] [id number] Follow-up Instructions Return if symptoms worsen or fail to improve. Upcoming Health Maintenance Date Due Hepatitis A Peds Age 1-18 (1 of 2 - Standard Series) 10/20/1998 DTaP/Tdap/Td series (1 - Tdap) 10/20/2004 HPV Age 9Y-34Y (1 of 3 - Female 3 Dose Series) 10/20/2008 Influenza Age 5 to Adult 2017 Allergies as of 2018  Review Complete On: 2018 By: Elsa Renae DO No Known Allergies Current Immunizations  Never Reviewed No immunizations on file. Not reviewed this visit You Were Diagnosed With   
  
 Codes Comments Scapular dyskinesis    -  Primary ICD-10-CM: G25.89 ICD-9-CM: 360. 3 Thoracic region somatic dysfunction     ICD-10-CM: M99.02 
ICD-9-CM: 739.2 Cervical somatic dysfunction     ICD-10-CM: M99.01 
ICD-9-CM: 739.1 Rib cage region somatic dysfunction     ICD-10-CM: M99.08 
ICD-9-CM: 739.8 Vitals BP Pulse Temp Resp Height(growth percentile) Weight(growth percentile) 111/67 65 98.6 °F (37 °C) 15 5' 4\" (1.626 m) 163 lb (73.9 kg) BMI OB Status Smoking Status 27.98 kg/m2 Having regular periods Never Smoker Vitals History BMI and BSA Data Body Mass Index Body Surface Area  
 27.98 kg/m 2 1.83 m 2 Preferred Pharmacy Pharmacy Name Phone [facility] [address] [facility], [address] [place] [address] [phone] Your Updated Medication List  
  
   
This list is accurate as of 18  1:50 PM.  Always use your most recent med list.  
  
  
  
  
 ibuprofen 200 mg Cap Take 200 mg by mouth as needed. meloxicam 15 mg tablet Commonly known as:  MOBIC Take 1 tab daily as needed pain with food. We Performed the Following [place] OSTEOPATHIC MANIP,3-4 BODY REGN [id number] CPT(R)] Follow-up Instructions Return if symptoms worsen or fail to improve. Introducing [address]! [place] [facility] introduces [employer] patient portal. Now you can access parts of your medical record, email your doctor's office, and request medication refills online. 1. In your internet browser, go to [email or web address] 2. Click on the First Time User? Click Here link in the Sign In box. You will see the New Member Sign Up page. 3. Enter your [employer] Access Code exactly as it appears below. You will not need to use this code after youve completed the sign-up process. If you do not sign up before the expiration date, you must request a new code. · [employer] Access Code: [email or web address] Expires: 4/18/2018 10:41 AM 
 
4. Enter the last four digits of your Social Security Number (xxxx) and Date of Birth (mm/dd/yyyy) as indicated and click Submit. You will be taken to the next sign-up page. 5. Create a [employer] ID. This will be your [employer] login ID and cannot be changed, so think of one that is secure and easy to remember. 6. Create a [employer] password. You can change your password at any time. 7. Enter your Password Reset Question and Answer. This can be used at a later time if you forget your password. 8. Enter your e-mail address. You will receive e-mail notification when new information is available in [address] 9. Click Sign Up. You can now view and download portions of your medical record. 10. Click the Download Summary menu link to download a portable copy of your medical information. If you have questions, please visit the Frequently Asked Questions section of the [employer] website.  Remember, [employer] is NOT to be used for urgent needs. For medical emergencies, dial 911. Now available from your iPhone and Android! Please provide this summary of care documentation to your next provider. Your primary care clinician is listed as NONE. If you have any questions after today's visit, please call [phone].

## 2018-05-21 ENCOUNTER — TELEPHONE (OUTPATIENT)
Dept: ORTHOPEDIC SURGERY | Age: 23
End: 2018-05-21

## 2018-05-21 NOTE — TELEPHONE ENCOUNTER
Patient is asking to talk to Dr. Nakita Brooks about her rib issues. Patient scheduled for Thursday, May 31st but will still like to talk to him if possible.   Patient can be reached at [phone]

## 2018-05-22 NOTE — TELEPHONE ENCOUNTER
Discussed she has tried return to swimming but pain left ribs returned after 2-3 days. Discussed avoiding things that cause Sx and will discuss further at appt 5/31/18 and she voiced understanding.

## 2018-05-31 ENCOUNTER — OFFICE VISIT (OUTPATIENT)
Dept: ORTHOPEDIC SURGERY | Age: 23
End: 2018-05-31

## 2018-05-31 VITALS
RESPIRATION RATE: 14 BRPM | DIASTOLIC BLOOD PRESSURE: 57 MMHG | SYSTOLIC BLOOD PRESSURE: 104 MMHG | TEMPERATURE: 98.6 F | BODY MASS INDEX: 27.49 KG/M2 | HEIGHT: 64 IN | HEART RATE: 74 BPM | WEIGHT: 161 LBS

## 2018-05-31 DIAGNOSIS — M99.02 THORACIC REGION SOMATIC DYSFUNCTION: ICD-10-CM

## 2018-05-31 DIAGNOSIS — M99.08 RIB CAGE REGION SOMATIC DYSFUNCTION: ICD-10-CM

## 2018-05-31 DIAGNOSIS — G25.89 SCAPULAR DYSKINESIS: Primary | ICD-10-CM

## 2018-05-31 NOTE — PROGRESS NOTES
HISTORY OF PRESENT ILLNESS    Fatoumata Arevalo is a 23y.o. year old female comes in today to be evaluated and treated for: rib pain    Was doing well with ribs after manip and PT but tried swimming and did 1.25 hrs for 3 days and then pain returned. Rested the rest of that week and then tried similar M-W-F and pain significant rated 2/10. Had been in PT and going to gym down in [place] doing HEP staying w/ grandmother. Social History     Social History    Marital status: SINGLE     Spouse name: N/A    Number of children: N/A    Years of education: N/A     Social History Main Topics    Smoking status: Never Smoker    Smokeless tobacco: Never Used    Alcohol use No    Drug use: No    Sexual activity: Not Asked     Other Topics Concern    None     Social History Narrative     Current Outpatient Prescriptions   Medication Sig Dispense Refill    ibuprofen 200 mg cap Take 200 mg by mouth as needed.  meloxicam (MOBIC) 15 mg tablet Take 1 tab daily as needed pain with food. 80 Tab 0     Past Medical History:   Diagnosis Date    Arthritis      Family History   Problem Relation Age of Onset    No Known Problems Father          ROS:  No numb, swell, bruise. Objective:  Visit Vitals    /57    Pulse 74    Temp 98.6 °F (37 °C)    Resp 14    Ht 5' 4\" (1.626 m)    Wt 161 lb (73 kg)    BMI 27.64 kg/m2   GEN:  Appears stated age in NAD. HEAD:  Normocephalic, Atraumatic. NEURO:  Sensation intact light touch upper and lower extremities.  Biceps & Triceps reflexes +2/4 bilaterally.  Patellar and Achilles +2/4  M/S:  Examined seating and supine.  Spurling negative bilateral .  Cervical rotation Decreased right TTA at T4, 5, 6, 7 on right, worse with flexion.  C3 left worse flexion.  Rib(s) 4, 5, 7 TTP on right. Strength +5/5 Bilateral upper and lower extremities. EXT  no clubbing/cyanosis.  no edema. SKIN: Warm & dry w/o rash.     Assessment/Plan:     ICD-10-CM ICD-9-CM    1. Scapular dyskinesis G25.89 781.3    2. Thoracic region somatic dysfunction M99.02 739.2 [place] OSTEOPATHIC MANIP,1-2 BODY REGN   3. Rib cage region somatic dysfunction M99.08 739.8 [place] OSTEOPATHIC MANIP,1-2 BODY REGN       Patient verbalizes understanding of evaluation and plan. Verbal consent obtained. Thoracic and Rib SD treated with ME and HVLA. Correction of previous malalignments verified after Tx. Pt tolerated well. Notes improvement of Sx and pain is now rated 1-2/10. HEP/stretches daily. Discussed stretching/strengthening/posture. Discussed need for gradual return to swim with 15-20 min 3/week and increase 5 min or so per week and hope to be improved by August.    Time with Pt 26 minutes, >50% of which was counseling pt regarding Dx and Tx options and coordination of care.

## 2018-05-31 NOTE — MR AVS SNAPSHOT
[address], [address] [facility] [address] 
[phone] Patient: Lashawn Meléndez MRN: [id number] :1995 Visit Information Date & Time Provider Department Dept. Phone Encounter #  
 2018  2:20 PM Marina Silva, [address] [facility] [phone] [id number] Follow-up Instructions Return if symptoms worsen or fail to improve. Follow-up and Disposition History Upcoming Health Maintenance Date Due Hepatitis A Peds Age 1-18 (1 of 2 - Standard Series) 10/20/1998 DTaP/Tdap/Td series (1 - Tdap) 10/20/2004 HPV Age 9Y-34Y (1 of 3 - Female 3 Dose Series) 10/20/2008 Influenza Age 5 to Adult 2018 Allergies as of 2018  Review Complete On: 2018 By: Marina Silva,  No Known Allergies Current Immunizations  Never Reviewed No immunizations on file. Not reviewed this visit You Were Diagnosed With   
  
 Codes Comments Scapular dyskinesis    -  Primary ICD-10-CM: G25.89 ICD-9-CM: 929. 3 Thoracic region somatic dysfunction     ICD-10-CM: M99.02 
ICD-9-CM: 739.2 Rib cage region somatic dysfunction     ICD-10-CM: M99.08 
ICD-9-CM: 739.8 Vitals BP Pulse Temp Resp Height(growth percentile) Weight(growth percentile) 104/57 74 98.6 °F (37 °C) 14 5' 4\" (1.626 m) 161 lb (73 kg) BMI OB Status Smoking Status 27.64 kg/m2 Having regular periods Never Smoker Vitals History BMI and BSA Data Body Mass Index Body Surface Area  
 27.64 kg/m 2 1.82 m 2 Preferred Pharmacy Pharmacy Name Phone [facility] [facility], [address] [place] [address] [phone] Your Updated Medication List  
  
   
This list is accurate as of 18  2:47 PM.  Always use your most recent med list.  
  
  
  
  
 ibuprofen 200 mg Cap Take 200 mg by mouth as needed. meloxicam 15 mg tablet Commonly known as:  MOBIC Take 1 tab daily as needed pain with food. We Performed the Following OR OSTEOPATHIC MANIP,1-2 BODY REGN N112427 CPT(R)] Follow-up Instructions Return if symptoms worsen or fail to improve. Introducing [facility]! [employer] introduces [employer] patient portal. Now you can access parts of your medical record, email your doctor's office, and request medication refills online. 1. In your internet browser, go to [email or web address] 2. Click on the First Time User? Click Here link in the Sign In box. You will see the New Member Sign Up page. 3. Enter your [employer] Access Code exactly as it appears below. You will not need to use this code after youve completed the sign-up process. If you do not sign up before the expiration date, you must request a new code. · [employer] Access Code: [email or web address] Expires: 8/7/2018 11:34 AM 
 
4. Enter the last four digits of your Social Security Number (xxxx) and Date of Birth (mm/dd/yyyy) as indicated and click Submit. You will be taken to the next sign-up page. 5. Create a [employer] ID. This will be your [employer] login ID and cannot be changed, so think of one that is secure and easy to remember. 6. Create a [employer] password. You can change your password at any time. 7. Enter your Password Reset Question and Answer. This can be used at a later time if you forget your password. 8. Enter your e-mail address. You will receive e-mail notification when new information is available in [address] 9. Click Sign Up. You can now view and download portions of your medical record. 10. Click the Download Summary menu link to download a portable copy of your medical information. If you have questions, please visit the Frequently Asked Questions section of the [employer] website. Remember, [employer] is NOT to be used for urgent needs. For medical emergencies, dial 911. Now available from your iPhone and Android! Please provide this summary of care documentation to your next provider. Your primary care clinician is listed as NONE. If you have any questions after today's visit, please call [phone].

## 2018-08-28 NOTE — LETTER
FINAL REPORT



EXAM:  MR MRA/MRV HEAD WO CON



HISTORY:  stroke 



TECHNIQUE:  Multiplanar MRA of [place] of [place]. No contrast

administered.



PRIORS:  CTA brain, 27 August 2018.



FINDINGS:  

Normal flow related enhancement in the basilar and bilateral

internal carotid arteries and their main intracranial branches.

No abnormal aneurysmal dilatation, focal signal dropout or signal

void to suggest significant stenosis or apparent occlusion. 



IMPRESSION:  

1. No significant findings. NAME: Lulu Arce : 1995 MRN: [id number] CONSENT FOR TREATMENT/PROCEDURE I authorize Briana Jacob DO at [facility] to perform the medical treatment and/or procedure(s) described below:  
 
Description of Medical Treatment and/or Procedure(s): (Specify number of treatments or procedures if repeated treatment is recommended) 
 
_____________inject trigger points thoracic spine_________________________ I understand my provider may be assisted with significant procedural tasks by other qualified medical practitioners, who may perform important parts of the treatment/procedure(s) or assist with the administration of analgesia (pain-relieving medications) as necessary for my treatment/procedure(s). These practitioners will only perform tasks within the scope of their licensure and practice, as determined under [place] law and any other applicable regulation(s). Name and Credentials of Practitioners Who May Assist with My Treatment/Procedure(s):  
 
______________________________________________________________________ I understand that a medical company representative may be present during my treatment/procedure(s) to observe and provide verbal, technical advice to my provider. I consent to the participation of this representative in my treatment/procedure(s). My provider has explained that unforeseen conditions may be identified during the performance of my treatment/procedure(s) that necessitate an extension of the original treatment/procedure(s) or the performance of procedures other than those identified above. I consent to the performance of additional treatment/procedure(s) by my provider and the qualified medical practitioners assisting my provider as deemed necessary by my provider for treatment of my medical condition(s).   
 
I understand that certain complications may arise during the use of analgesia during my treatment/procedure(s) that may include, but are not limited to, decreased/altered awareness, respiratory problems, drug reactions, paralysis, brain damage, or possibly, death. I understand that the analgesia required for the performance of my treatment/procedure(s) involves additional risks beyond those of the treatment/procedure(s) to be performed, and authorize the use of analgesia by my provider as deemed necessary for the control of pain during my treatment/procedure(s). I understand that my provider may need to change the type of analgesia or medications used, possibly without explanation to me, and I consent to any such changes as deemed necessary by my provider for treatment of my medical condition(s). I understand that there are risks of infection and other unexpected complications associated with any medical or surgical treatment/procedure including the treatment/procedure(s) listed above or other treatment/procedure(s) necessitated by my medical condition, and that such complications may occur in the absence of any negligence on the part of my healthcare providers. My provider has explained to my satisfaction my medical condition and the specific treatment/procedure(s) recommended and identified above. I have been given an opportunity to ask and have answered to my satisfaction questions about: (CONTINUED PAGE 2) NAME: Marita Verde : 1995 MRN: [id number]  The nature and extent of the treatment/procedure(s) to be performed;  The benefit of treatment, and the risks associated with not having the recommended treatment/procedure(s);  The risks and possible complications associated with having the treatment, including those which, even though   
   unlikely to occur, may involve serious consequences;  
 Analgesia and alternative forms of analgesia;  Alternative procedures and methods of treatment, and the risk associated with each;  
  The expected consequences of the treatment/procedure(s) on my future health. I understand that no assurance can be given that the treatment/procedure(s) performed will be a success, and no guarantee or warranty of success for the treatment/procedure(s) has been given to me by my provider. I consent to the disposal by the examining Pathologist of any tissue removed during my treatment/procedure(s) in accordance with the receiving hospitals or laboratorys policy and any associated regulations specific to such disposal.  
 
I DO__x___  DO NOT______ consent to other health care personnel observing my treatment/procedure(s) for the purpose of medical education or other specified purposes as may be explained by my provider. I DO_______ DO NOT____x____ consent to photography or videotaping of all or any part of my treatment/procedure(s) for medical and/or educational purposes. I understand that my identity will not be revealed in any photographs, videos, or accompanying explanations should these images be used by my healthcare providers. I certify that I have read and fully understand the above Consent for Treatment/Procedure and that all blanks were completed before I signed this consent.  
 
__________Genesis Delaney_________                      _______________ Print Name of Patient or Legal Representative        Relationship to Patient (if not self) X_______________________________            __________________________ Signature of Patient (or legal representative)  Witness to signature    
 
                       __1/18/2018___/_________AM/PM 
                                                                   Date/Time (If patient is a minor or is unable to sign, complete the following) Patient is a minor, ________ years of age, or is unable to sign due to:______________________ I have explained the nature, purpose and anticipated benefits as well as any possible alternative methods of treatment, the known risks that are involved, and the possibility of complications of the proposed procedure(s) to the patient or patients legal representative. I have provided the patient or legal representative with an opportunity to ask and have answered to their satisfaction any questions about the proposed treatment/procedure(s) and alternative methods of treatment.   
 
Provider Signature:___________________  Date:__1/18/2018__Time:_____________AM/PM

## 2018-09-10 ENCOUNTER — OFFICE VISIT (OUTPATIENT)
Dept: ORTHOPEDIC SURGERY | Age: 23
End: 2018-09-10

## 2018-09-10 VITALS
RESPIRATION RATE: 14 BRPM | TEMPERATURE: 98 F | HEART RATE: 79 BPM | SYSTOLIC BLOOD PRESSURE: 119 MMHG | DIASTOLIC BLOOD PRESSURE: 79 MMHG | BODY MASS INDEX: 27.14 KG/M2 | WEIGHT: 159 LBS | HEIGHT: 64 IN

## 2018-09-10 DIAGNOSIS — M99.08 RIB CAGE REGION SOMATIC DYSFUNCTION: ICD-10-CM

## 2018-09-10 DIAGNOSIS — M99.01 CERVICAL SOMATIC DYSFUNCTION: ICD-10-CM

## 2018-09-10 DIAGNOSIS — M99.02 THORACIC REGION SOMATIC DYSFUNCTION: ICD-10-CM

## 2018-09-10 DIAGNOSIS — M94.0 COSTOCHONDRITIS: Primary | ICD-10-CM

## 2018-09-10 NOTE — MR AVS SNAPSHOT
[address] [place], [address] [facility] 83 [address] 
[phone] Patient: Radha Ruiz MRN: [id number] :1995 Visit Information Date & Time Provider Department Dept. Phone Encounter #  
 9/10/2018  4:00 PM Jeanmarie Sullivan, [address] [facility] [phone] [id number] Follow-up Instructions Return if symptoms worsen or fail to improve. Follow-up and Disposition History Upcoming Health Maintenance Date Due Hepatitis A Peds Age 1-18 (1 of 2 - Standard Series) 10/20/1998 DTaP/Tdap/Td series (1 - Tdap) 10/20/2004 HPV Age 9Y-34Y (1 of 3 - Female 3 Dose Series) 10/20/2008 Influenza Age 5 to Adult 2018 Allergies as of 9/10/2018  Review Complete On: 9/10/2018 By: Jeanmarie Sullivan,  No Known Allergies Current Immunizations  Never Reviewed No immunizations on file. Not reviewed this visit You Were Diagnosed With   
  
 Codes Comments Costochondritis    -  Primary ICD-10-CM: M94.0 ICD-9-CM: 733.6 Thoracic region somatic dysfunction     ICD-10-CM: M99.02 
ICD-9-CM: 739.2 Rib cage region somatic dysfunction     ICD-10-CM: M99.08 
ICD-9-CM: 739.8 Cervical somatic dysfunction     ICD-10-CM: M99.01 
ICD-9-CM: 739.1 Vitals BP Pulse Temp Resp Height(growth percentile) Weight(growth percentile) 119/79 79 98 °F (36.7 °C) 14 5' 4\" (1.626 m) 159 lb (72.1 kg) BMI OB Status Smoking Status 27.29 kg/m2 Having regular periods Never Smoker Vitals History BMI and BSA Data Body Mass Index Body Surface Area  
 27.29 kg/m 2 1.8 m 2 Preferred Pharmacy Pharmacy Name Phone [facility] 9[address] [facility], [address] [place] [address] [phone] Your Updated Medication List  
  
   
This list is accurate as of 9/10/18  4:45 PM.  Always use your most recent med list.  
  
  
  
  
 ibuprofen 200 mg Cap Take 200 mg by mouth as needed. meloxicam 15 mg tablet Commonly known as:  MOBIC Take 1 tab daily as needed pain with food. Follow-up Instructions Return if symptoms worsen or fail to improve. To-Do List   
 09/10/2018 Imaging:  XR RIBS BI 3 V Introducing [facility]! [employer] introduces [employer] patient portal. Now you can access parts of your medical record, email your doctor's office, and request medication refills online. 1. In your internet browser, go to [email or web address] 2. Click on the First Time User? Click Here link in the Sign In box. You will see the New Member Sign Up page. 3. Enter your [employer] Access Code exactly as it appears below. You will not need to use this code after youve completed the sign-up process. If you do not sign up before the expiration date, you must request a new code. · [employer] Access Code: [email or web address] Expires: 12/9/2018  4:45 PM 
 
4. Enter the last four digits of your Social Security Number (xxxx) and Date of Birth (mm/dd/yyyy) as indicated and click Submit. You will be taken to the next sign-up page. 5. Create a [employer] ID. This will be your [employer] login ID and cannot be changed, so think of one that is secure and easy to remember. 6. Create a [employer] password. You can change your password at any time. 7. Enter your Password Reset Question and Answer. This can be used at a later time if you forget your password. 8. Enter your e-mail address. You will receive e-mail notification when new information is available in [address] 9. Click Sign Up. You can now view and download portions of your medical record. 10. Click the Download Summary menu link to download a portable copy of your medical information. If you have questions, please visit the Frequently Asked Questions section of the [employer] website.  Remember, [employer] is NOT to be used for urgent needs. For medical emergencies, dial 911. Now available from your iPhone and Android! Please provide this summary of care documentation to your next provider. Your primary care clinician is listed as NONE. If you have any questions after today's visit, please call [phone].

## 2018-09-10 NOTE — PROGRESS NOTES
HISTORY OF PRESENT ILLNESS    Ramonita Owen is a 23y.o. year old female comes in today to be evaluated and treated for: rib pain    Since last appt had been doing pretty well with swim up to 1.5 hours but pain worse now that swim practice is back and pain ribs left lower and now some on right at very bottom. Some pain twist and none big breath. Pain 9/10 and bad with fast intervals in pool. Mobic helps. Social History     Social History    Marital status: SINGLE     Spouse name: N/A    Number of children: N/A    Years of education: N/A     Social History Main Topics    Smoking status: Never Smoker    Smokeless tobacco: Never Used    Alcohol use No    Drug use: No    Sexual activity: Not Asked     Other Topics Concern    None     Social History Narrative     Current Outpatient Prescriptions   Medication Sig Dispense Refill    ibuprofen 200 mg cap Take 200 mg by mouth as needed.  meloxicam (MOBIC) 15 mg tablet Take 1 tab daily as needed pain with food. 80 Tab 0     Past Medical History:   Diagnosis Date    Arthritis      Family History   Problem Relation Age of Onset    No Known Problems Father          ROS:  No numb, swell, bruise    Objective:  /79  Pulse 79  Temp 98 °F (36.7 °C)  Resp 14  Ht 5' 4\" (1.626 m)  Wt 159 lb (72.1 kg)  BMI 27.29 kg/m2  GEN:  Appears stated age in NAD. HEAD:  Normocephalic, Atraumatic. NEURO:  Sensation intact light touch upper and lower extremities.  Biceps & Triceps reflexes +2/4 bilaterally.  Patellar and Achilles +2/4  M/S:  Examined seating and supine.  Spurling negative bilateral .  Cervical rotation Decreased right TTA at T4, 5, 6, 7 on right, worse with flexion.  C3 left worse flexion.  Rib(s) 4, 5, 7 TTP on right. Strength +5/5 Bilateral upper and lower extremities. T2, 3 left worse flexion and ribs 2/3 posterior left. EXT  no clubbing/cyanosis.  no edema. SKIN: Warm & dry w/o rash. Assessment/Plan:     ICD-10-CM ICD-9-CM    1. Costochondritis M94.0 733.6 XR RIBS BI 3 V   2. Thoracic region somatic dysfunction M99.02 739.2    3. Rib cage region somatic dysfunction M99.08 739.8    4. Cervical somatic dysfunction M99.01 739.1        Patient (or guardian if minor) verbalizes understanding of evaluation and plan. Verbal consent obtained. Cervical, Thoracic and Rib SD treated with ME, HVLA and Still's. Correction of previous malalignments verified after Tx. Pt tolerated well. Notes improvement of Sx and pain is now rated 2/10. HEP/stretches daily. Discussed stretching/strengthening/posture. Will consider eval and Tx for anxiety and will limit swimming to prevent recurrence and get XR if insurance okays  and RTC as needed. Time with Pt 27 minutes, >50% of which was counseling pt regarding Dx and Tx options and coordination of care.

## 2018-10-09 NOTE — PROGRESS NOTES
PT DAILY TREATMENT NOTE 8-14 Patient Name: Lashawn Meléndez Date:10/9/2018 : 1995 [x]  Patient  Verified Payor: SELF PAY / Plan: [employer] SELF PAY / Product Type: Self Pay / In time: 9:00 am      Out time:  10:03 am 
Total Treatment Time (min): 63 Visit #: 1 of 4 Treatment Area: Rib pain on left side [R07.81] SUBJECTIVE Pain Level (0-10 scale): 2 Any medication changes, allergies to medications, adverse drug reactions, diagnosis change, or new procedure performed?: [x] No    [] Yes (see summary sheet for update) Subjective functional status/changes:   [] No changes reported \"I am okay. \" OBJECTIVE Modality rationale: decrease inflammation, decrease pain and increase tissue extensibility to improve the patients ability to perform functional mobility and improve activity  endurance Min Type Additional Details NT [x] Estim Attended - using hand held stim unit - intensity 4, 3 hZ  []w/US   []w/ice   []w/heat Position: supine Location: (L) obliques at rib cage  
 []  Traction: [] Cervical       []Lumbar 
                     [] Prone          []Supine []Intermittent   []Continuous Lbs: 
[] before manual 
[] after manual  
8 [x]  Ultrasound   []Continuous   [x] Pulsed []1MHz   [x]3MHz Location: (L) costal cartilage T8-10 W/cm2: 1.2 NT [x]  Iontophoresis with dexamethasone Location: (L) rib  [x] Take home patch  
[] In clinic  
 []  Ice     []  heat 
[]  Ice massage Position: Location:   
 []  Vasopneumatic Device Pressure:       [] lo [] med [] hi  
Temperature: [] lo [] med [] hi  
[x] Skin assessment post-treatment:  [x]intact []redness- no adverse reaction 
     []redness  adverse reaction:  
 
 
45 min Therapeutic Exercise:  [x] See flow sheet: added wall V's with liftoff Rationale: increase ROM, increase strength and improve coordination to 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 10 min Manual Therapy: SI check -  (R) post ilial rotation - corrected with MET and shot gun (no cavitation noted) Rationale: decrease pain, increase ROM, increase tissue extensibility, decrease trigger points and increase postural awareness to return to PLOF and swimming painfree X min Patient Education: [x] Review HEP Other Objective/Functional Measures:  
 
 
Pain Level (0-10 scale) post treatment: 0 
 
ASSESSMENT/Changes in Function:   
Good tolerance to treatment. Patient will continue to benefit from skilled PT services to modify and progress therapeutic interventions, address functional mobility deficits, address ROM deficits, address strength deficits, analyze and address soft tissue restrictions, analyze and cue movement patterns, analyze and modify body mechanics/ergonomics and assess and modify postural abnormalities to attain remaining goals. []  See Plan of Care [x]  See progress note/recertification (9/1/35) []  See Discharge Summary Progress towards goals / Updated goals: Cont per unmet goals as below per PN 3/13/18 1. Patient will increase FOTO score to 67/100 for indications of increased functional mobility. 2.  Patient will demo LT strength 4+/5 for improve scapular strength with breast stroke 3. Patient will demo SA strength 5/5 to improve scapular stability with freestyle stroke. 4.  Patient will demo (-) pelvic obliquity for 2 consecutive treatments to improve pelvic stability for safe return to swimming. 
   
 
PLAN [x]  Upgrade activities as tolerated     [x]  Continue plan of care 
[]  Update interventions per flow sheet      
[]  Discharge due to:_ 
[x]  Other: see PN; cont 1x4 Dez Flores, PTA 4/5/2018

## 2018-10-22 ENCOUNTER — TELEPHONE (OUTPATIENT)
Dept: ORTHOPEDIC SURGERY | Age: 23
End: 2018-10-22

## 2018-10-22 NOTE — TELEPHONE ENCOUNTER
Patient called again to make sure message was in chart. Patient was advised provider is in office seeing patients and that message will be answered as soon as possible.

## 2018-10-22 NOTE — TELEPHONE ENCOUNTER
After speaking with Dr. Giuliano Vasques, I called and informed Pt that she will need to see Dr. Giuliano Vasques in the office for a visit and to receive the requested letter. She understood and stated that she would call and make an appointment for tomorrow at the [address] [place] [address],[address] location. I advised her that there looked to be appt availability in the late afternoon.

## 2018-10-22 NOTE — TELEPHONE ENCOUNTER
Pt is calling to speak with Dr. Padmaja Chiu or his nurse regarding her ribs. Please advise pt at [phone].

## 2018-10-22 NOTE — TELEPHONE ENCOUNTER
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 She states that she needs a formal letter from Dr. Yonatan Mejia stating that it's not in her best interest to continue with swimming participation due to medical condition.

## 2018-10-23 ENCOUNTER — OFFICE VISIT (OUTPATIENT)
Dept: ORTHOPEDIC SURGERY | Age: 23
End: 2018-10-23

## 2018-10-23 VITALS
TEMPERATURE: 98.3 F | SYSTOLIC BLOOD PRESSURE: 119 MMHG | DIASTOLIC BLOOD PRESSURE: 71 MMHG | WEIGHT: 154 LBS | HEIGHT: 64 IN | HEART RATE: 77 BPM | BODY MASS INDEX: 26.29 KG/M2 | RESPIRATION RATE: 15 BRPM

## 2018-10-23 DIAGNOSIS — M94.0 COSTOCHONDRITIS: Primary | ICD-10-CM

## 2018-10-23 DIAGNOSIS — M99.08 RIB CAGE REGION SOMATIC DYSFUNCTION: ICD-10-CM

## 2018-10-23 DIAGNOSIS — M99.02 THORACIC REGION SOMATIC DYSFUNCTION: ICD-10-CM

## 2018-10-23 NOTE — PROGRESS NOTES
HISTORY OF PRESENT ILLNESS    Elliot Thorpe is a 26y.o. year old female comes in today to be evaluated and treated for follow-up: rib pain    Since last appt has noticed no improvement with rib pain and unable to limit swimming which causes Sx. 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 Social History     Socioeconomic History    Marital status: SINGLE     Spouse name: Not on file    Number of children: Not on file    Years of education: Not on file    Highest education level: Not on file   Social Needs    Financial resource strain: Not on file    Food insecurity - worry: Not on file    Food insecurity - inability: Not on file    Transportation needs - medical: Not on file   Shopliment needs - non-medical: Not on file   Occupational History    Not on file   Tobacco Use    Smoking status: Never Smoker    Smokeless tobacco: Never Used   Substance and Sexual Activity    Alcohol use: No    Drug use: No    Sexual activity: Not on file   Other Topics Concern    Not on file   Social History Narrative    Not on file     Current Outpatient Medications   Medication Sig Dispense Refill    ibuprofen 200 mg cap Take 200 mg by mouth as needed.  meloxicam (MOBIC) 15 mg tablet Take 1 tab daily as needed pain with food. 80 Tab 0     Past Medical History:   Diagnosis Date    Arthritis      Family History   Problem Relation Age of Onset    No Known Problems Father      ROS:  No numb, bruise, swell    Objective:  /71   Pulse 77   Temp 98.3 °F (36.8 °C)   Resp 15   Ht 5' 4\" (1.626 m)   Wt 154 lb (69.9 kg)   BMI 26.43 kg/m²   GEN:  Appears stated age in NAD. HEAD:  Normocephalic, Atraumatic.   NEURO:  Sensation intact light touch upper and lower extremities.  Biceps & Triceps reflexes +2/4 bilaterally.  Patellar and Achilles +2/4  M/S:  Examined seating and supine.  Spurling negative bilateral. Strength +5/5 Bilateral upper and lower extremities. T2, 3, 4 left worse flexion and ribs 2, 3, 4 posterior left. EXT  no clubbing/cyanosis.  no edema. SKIN: Warm & dry w/o rash. Assessment/Plan:     ICD-10-CM ICD-9-CM    1. Costochondritis M94.0 733.6    2. Rib cage region somatic dysfunction M99.08 739.8 MI OSTEOPATHIC MANIP,1-2 BODY REGN   3. Thoracic region somatic dysfunction M99.02 739.2 MI OSTEOPATHIC MANIP,1-2 BODY REGN     Patient (or guardian if minor) verbalizes understanding of evaluation and plan. Verbal consent obtained. Thoracic and Rib SD treated with HVLA. Correction of previous malalignments verified after Tx. Pt tolerated well. Notes improvement of Sx and pain is now rated 1-2/10. HEP/stretches daily. Discussed stretching/strengthening/posture.

## 2018-10-23 NOTE — LETTER
NOTIFICATION RETURN TO WORK / SCHOOL 
 
10/23/2018 3:45 PM 
 
Ms. Darylene Madden [id number] [place] [id number] To Whom It May Concern: 
 
Darylene Madden is currently under the care of [facility].. 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 Condition not expected to improve. If there are questions or concerns please have the patient contact our office.  
 
 
 
Sincerely, 
 
 
Sb Anaya, DO

## 2019-05-03 NOTE — PROGRESS NOTES
[address] [facility] AT [address] Phone: [phone] Fax: [phone] DISCHARGE SUMMARY Patient Name: Saba Bruno : 1995 Treatment/Medical Diagnosis: Rib pain on left side [R07.81] Referral Source: Chris Adams DO Date of Initial Visit: 18 Attended Visits: 15 Missed Visits: 0  
SUMMARY OF TREATMENTPatient is being treated for co L sided rib pain starting 2-3 mo ago insidious onset with progressive worsening. Treatment has included JOSE JUAN therex for scapular and thoracic strengthening, manual therapy for pelvic correction, thoracic mobilization, manual release/stretching, US to reduce inflammation; IONTO and ES/US combo for pain relief. CURRENT STATUSPatient has made slow, steady progress in PT. Patient notes reduced average pain levels to 2-3/10 versus 6-8/10 at IE. Thoracic mobility has improved in both directions and patient demos inc scapular strength and reduced rib flare. Subjective improvement of 87% in symptoms since IE reported. Objective assessment as follows: 
Improvements: \"doesn't hurt as much anymore,\" driving, prolonged sitting, walking, cooking/baking Deficits: (L) S/L sleeping, recreational activities (holding from swimming / soccer), activities elevating respiration rate FOTO score: 62/100 (at last assessment, 52/100) T/S AROM rotation: 100% in both directions after SIJ correction Scapular strength: SA/MT 4+/5, LT 4/5 Core strength: 100% bridge, 30 second side plank limited by fatigue Pain: (B) 2, (W) 4 
 
Goal/Measure of Progress 1.  Patient will increase FOTO score to 67/100 for indications of increased functional mobility.  -Goal progressing; inc to 62/100 from 52/100 at last assessment 2.  Patient will demo LT strength 4/5 for improve scapular strength with breast stroke -Goal met; 4/5 LT strength (3/27/18) 3.  Patient will demo 5/5 MT strength for improved postural stability with sitting in class. -Goal progressing; 4+/5 MT strength 4. Patient will report 50% improvement in sx for progression of swimming quality for school scholarship. -Goal met; pt notes 87% improvement with daily activities RECOMMENDATIONS Patient returned for only 1 visit after above written PN. - DC at this time sec to non return. If you have any questions/comments please contact us directly at [phone]. Thank you for allowing us to assist in the care of your patient. PT Signature: Kareem Rodriguez PT  Date: 5/3/2019   Time: 744am

## 2019-07-11 ENCOUNTER — HOSPITAL ENCOUNTER (OUTPATIENT)
Dept: LAB | Age: 24
Discharge: HOME | End: 2019-07-11
Payer: COMMERCIAL

## 2019-07-11 PROCEDURE — 88175 CYTOPATH C/V AUTO FLUID REDO: CPT
